# Patient Record
Sex: MALE | Race: BLACK OR AFRICAN AMERICAN | NOT HISPANIC OR LATINO | Employment: FULL TIME
[De-identification: names, ages, dates, MRNs, and addresses within clinical notes are randomized per-mention and may not be internally consistent; named-entity substitution may affect disease eponyms.]

---

## 2017-02-08 ENCOUNTER — DIAGNOSTIC TRANS (OUTPATIENT)
Dept: OTHER | Age: 32
End: 2017-02-08

## 2017-02-08 ENCOUNTER — HOSPITAL (OUTPATIENT)
Dept: OTHER | Age: 32
End: 2017-02-08
Attending: EMERGENCY MEDICINE

## 2017-02-08 LAB
ANALYZER ANC (IANC): ABNORMAL
ANION GAP SERPL CALC-SCNC: 15 MMOL/L (ref 10–20)
BASOPHILS # BLD: 0 THOUSAND/MCL (ref 0–0.3)
BASOPHILS NFR BLD: 0 %
BUN SERPL-MCNC: 14 MG/DL (ref 10–20)
BUN/CREAT SERPL: 16 (ref 7–25)
CALCIUM SERPL-MCNC: 9.1 MG/DL (ref 8.4–10.2)
CHLORIDE: 105 MMOL/L (ref 98–107)
CO2 SERPL-SCNC: 25 MMOL/L (ref 21–32)
CREAT SERPL-MCNC: 0.85 MG/DL (ref 0.67–1.17)
DIFFERENTIAL METHOD BLD: ABNORMAL
EOSINOPHIL # BLD: 0.1 THOUSAND/MCL (ref 0.1–0.5)
EOSINOPHIL NFR BLD: 0 %
ERYTHROCYTE [DISTWIDTH] IN BLOOD: 15.1 % (ref 11–15)
GLUCOSE SERPL-MCNC: 100 MG/DL (ref 65–99)
HEMATOCRIT: 43 % (ref 39–51)
HGB BLD-MCNC: 14 GM/DL (ref 13–17)
LYMPHOCYTES # BLD: 2.8 THOUSAND/MCL (ref 1–4.8)
LYMPHOCYTES NFR BLD: 24 %
MCH RBC QN AUTO: 25.7 PG (ref 26–34)
MCHC RBC AUTO-ENTMCNC: 32.6 GM/DL (ref 32–36.5)
MCV RBC AUTO: 78.9 FL (ref 78–100)
MONOCYTES # BLD: 1.9 THOUSAND/MCL (ref 0.3–0.9)
MONOCYTES NFR BLD: 17 %
NEUTROPHILS # BLD: 6.6 THOUSAND/MCL (ref 1.8–7.7)
NEUTROPHILS NFR BLD: 59 %
NEUTS SEG NFR BLD: ABNORMAL %
PERCENT NRBC: ABNORMAL
PLATELET # BLD: 294 THOUSAND/MCL (ref 140–450)
POTASSIUM SERPL-SCNC: 3.7 MMOL/L (ref 3.4–5.1)
RBC # BLD: 5.45 MILLION/MCL (ref 4.5–5.9)
SODIUM SERPL-SCNC: 141 MMOL/L (ref 135–145)
TROPONIN I SERPL HS-MCNC: <0.02 NG/ML
WBC # BLD: 11.4 THOUSAND/MCL (ref 4.2–11)

## 2017-04-16 ENCOUNTER — HOSPITAL (OUTPATIENT)
Dept: OTHER | Age: 32
End: 2017-04-16
Attending: INTERNAL MEDICINE

## 2017-04-16 LAB
# SPEC OBTAINED: 4
ALBUMIN SERPL-MCNC: 3.9 GM/DL (ref 3.6–5.1)
ALBUMIN/GLOB SERPL: 1 {RATIO} (ref 1–2.4)
ALP SERPL-CCNC: 85 UNIT/L (ref 45–117)
ALT SERPL-CCNC: 40 UNIT/L
ANALYZER ANC (IANC): ABNORMAL
ANION GAP SERPL CALC-SCNC: 13 MMOL/L (ref 10–20)
APPEARANCE CSF: ABNORMAL
AST SERPL-CCNC: 29 UNIT/L
BASOPHILS # BLD: 0 THOUSAND/MCL (ref 0–0.3)
BASOPHILS NFR BLD: 0 %
BASOPHILS NFR CSF: 1 %
BILIRUB SERPL-MCNC: 1 MG/DL (ref 0.2–1)
BUN SERPL-MCNC: 13 MG/DL (ref 6–20)
BUN/CREAT SERPL: 16 (ref 7–25)
C GATTII+NEOFOR DNA CSF QL NAA+NON-PROBE: NOT DETECTED
CALCIUM SERPL-MCNC: 9 MG/DL (ref 8.4–10.2)
CHLORIDE: 103 MMOL/L (ref 98–107)
CMV DNA CSF QL NAA+NON-PROBE: NOT DETECTED
CO2 SERPL-SCNC: 27 MMOL/L (ref 21–32)
CREAT SERPL-MCNC: 0.8 MG/DL (ref 0.67–1.17)
CSF DIFFERENTIAL REMARKS(CREM): ABNORMAL
DIFFERENTIAL METHOD BLD: ABNORMAL
E COLI K1 DNA CSF QL NAA+NON-PROBE: NOT DETECTED
EOSINOPHIL # BLD: 0 THOUSAND/MCL (ref 0.1–0.5)
EOSINOPHIL NFR BLD: 0 %
EOSINOPHIL NFR CSF: ABNORMAL %
ERYTHROCYTE [DISTWIDTH] IN BLOOD: 14.8 % (ref 11–15)
EV RNA CSF QL NAA+NON-PROBE: NOT DETECTED
GLOBULIN SER-MCNC: 4.1 GM/DL (ref 2–4)
GLUCOSE CSF-MCNC: 65 MG/DL (ref 40–70)
GLUCOSE SERPL-MCNC: 132 MG/DL (ref 65–99)
GP B STREP DNA CSF QL NAA+NON-PROBE: NOT DETECTED
GP B STREP DNA CSF QL NAA+NON-PROBE: NOT DETECTED
HAEM INFLU DNA CSF QL NAA+NON-PROBE: NOT DETECTED
HEMATOCRIT: 44.5 % (ref 39–51)
HGB BLD-MCNC: 14.7 GM/DL (ref 13–17)
HHV6 DNA CSF QL NAA+NON-PROBE: NOT DETECTED
HSV1 DNA CSF QL NAA+NON-PROBE: NOT DETECTED
HSV2 DNA CSF QL NAA+NON-PROBE: POSITIVE
L MONOCYTOG DNA CSF QL NAA+NON-PROBE: NOT DETECTED
LACTATE BLDV-SCNC: 1.3 MMOL/L (ref 0–2)
LYMPHOCYTES # BLD: 1.7 THOUSAND/MCL (ref 1–4.8)
LYMPHOCYTES NFR BLD: 11 %
LYMPHOCYTES NFR CSF: 87 % (ref 28–95)
MCH RBC QN AUTO: 26.4 PG (ref 26–34)
MCHC RBC AUTO-ENTMCNC: 33 GM/DL (ref 32–36.5)
MCV RBC AUTO: 79.9 FL (ref 78–100)
MONOCYTES # BLD: 0.9 THOUSAND/MCL (ref 0.3–0.9)
MONOCYTES NFR BLD: 6 %
MONOS+MACROS NFR CSF: 9 % (ref 16–56)
N MEN DNA CSF QL NAA+NON-PROBE: NOT DETECTED
NEUTROPHILS # BLD: 12.7 THOUSAND/MCL (ref 1.8–7.7)
NEUTROPHILS NFR BLD: 83 %
NEUTS SEG NFR BLD: ABNORMAL %
NEUTS SEG NFR CSF: 3 %
NUC CELL # CSF: 676 /MCL (ref 0–10)
PARECHOVIRUS A RNA CSF QL NAA+NON-PROBE: NOT DETECTED
PERCENT NRBC: ABNORMAL
PLATELET # BLD: 344 THOUSAND/MCL (ref 140–450)
POTASSIUM SERPL-SCNC: 4.1 MMOL/L (ref 3.4–5.1)
PROT CSF-MCNC: 116 MG/DL (ref 15–45)
PROT SERPL-MCNC: 8 GM/DL (ref 6.4–8.2)
RBC # BLD: 5.57 MILLION/MCL (ref 4.5–5.9)
RBC # CSF: 1 /MCL
RBC TUBE1 # CSF MANUAL: 42.7 CELLS/MCL
SODIUM SERPL-SCNC: 139 MMOL/L (ref 135–145)
SPECIMEN SOURCE: ABNORMAL
SPECIMEN VOL CSF: 5.7 ML
TUBE # CSF: 4
VARICELLA ZOSTER VIRUS (RMEPNL): NOT DETECTED
WBC # BLD: 15.2 THOUSAND/MCL (ref 4.2–11)
XANTHOCHROMIA CSF QL: ABNORMAL

## 2017-04-17 ENCOUNTER — CHARTING TRANS (OUTPATIENT)
Dept: OTHER | Age: 32
End: 2017-04-17

## 2017-04-17 LAB
AMORPH SED URNS QL MICRO: ABNORMAL
ANALYZER ANC (IANC): ABNORMAL
ANION GAP SERPL CALC-SCNC: 11 MMOL/L (ref 10–20)
APPEARANCE UR: CLEAR
BILIRUB UR QL: NEGATIVE
BUN SERPL-MCNC: 11 MG/DL (ref 6–20)
BUN/CREAT SERPL: 15 (ref 7–25)
CALCIUM SERPL-MCNC: 8.2 MG/DL (ref 8.4–10.2)
CAOX CRY URNS QL MICRO: ABNORMAL
CHLORIDE: 102 MMOL/L (ref 98–107)
CO2 SERPL-SCNC: 26 MMOL/L (ref 21–32)
COLOR UR: YELLOW
CREAT SERPL-MCNC: 0.74 MG/DL (ref 0.67–1.17)
EPITH CASTS #/AREA URNS LPF: ABNORMAL /[LPF]
ERYTHROCYTE [DISTWIDTH] IN BLOOD: 15 % (ref 11–15)
FATTY CASTS #/AREA URNS LPF: ABNORMAL /[LPF]
GLUCOSE SERPL-MCNC: 129 MG/DL (ref 65–99)
GLUCOSE UR-MCNC: NEGATIVE MG/DL
GRAN CASTS #/AREA URNS LPF: ABNORMAL /[LPF]
HEMATOCRIT: 40.4 % (ref 39–51)
HGB BLD-MCNC: 12.9 GM/DL (ref 13–17)
HGB UR QL: NEGATIVE
HIV ANTIGEN/ANTIBODY SCREEN: NONREACTIVE
HYALINE CASTS #/AREA URNS LPF: ABNORMAL /[LPF]
KETONES UR-MCNC: NEGATIVE MG/DL
LEUKOCYTE ESTERASE UR QL STRIP: NEGATIVE
MCH RBC QN AUTO: 25.9 PG (ref 26–34)
MCHC RBC AUTO-ENTMCNC: 31.9 GM/DL (ref 32–36.5)
MCV RBC AUTO: 81.1 FL (ref 78–100)
MICROSCOPIC (MT): ABNORMAL
MIXED CELL CASTS #/AREA URNS LPF: ABNORMAL /[LPF]
MUCOUS THREADS URNS QL MICRO: ABNORMAL
NITRITE UR QL: NEGATIVE
PH UR: 6 UNIT (ref 5–7)
PLATELET # BLD: 269 THOUSAND/MCL (ref 140–450)
POTASSIUM SERPL-SCNC: 3.3 MMOL/L (ref 3.4–5.1)
PROT UR QL: NEGATIVE MG/DL
RBC # BLD: 4.98 MILLION/MCL (ref 4.5–5.9)
RBC CASTS #/AREA URNS LPF: ABNORMAL /[LPF]
RENAL EPI CELLS #/AREA URNS HPF: ABNORMAL /[HPF]
SODIUM SERPL-SCNC: 136 MMOL/L (ref 135–145)
SP GR UR: >1.03 (ref 1–1.03)
SPECIMEN SOURCE: ABNORMAL
SPERM URNS QL MICRO: ABNORMAL
T VAGINALIS URNS QL MICRO: ABNORMAL
TRI-PHOS CRY URNS QL MICRO: ABNORMAL
URATE CRY URNS QL MICRO: ABNORMAL
URNS CMNT MICRO: ABNORMAL
UROBILINOGEN UR QL: 0.2 MG/DL (ref 0–1)
VANCOMYCIN TROUGH SERPL-MCNC: 11.6 MCG/ML (ref 10–20)
WAXY CASTS #/AREA URNS LPF: ABNORMAL /[LPF]
WBC # BLD: 10.9 THOUSAND/MCL (ref 4.2–11)
WBC CASTS #/AREA URNS LPF: ABNORMAL /[LPF]
YEAST HYPHAE URNS QL MICRO: ABNORMAL
YEAST URNS QL MICRO: ABNORMAL

## 2017-04-18 LAB
ANION GAP SERPL CALC-SCNC: 10 MMOL/L (ref 10–20)
BUN SERPL-MCNC: 7 MG/DL (ref 6–20)
BUN/CREAT SERPL: 9 (ref 7–25)
CALCIUM SERPL-MCNC: 8.2 MG/DL (ref 8.4–10.2)
CHLORIDE: 101 MMOL/L (ref 98–107)
CO2 SERPL-SCNC: 30 MMOL/L (ref 21–32)
CREAT SERPL-MCNC: 0.77 MG/DL (ref 0.67–1.17)
GLUCOSE SERPL-MCNC: 109 MG/DL (ref 65–99)
POTASSIUM SERPL-SCNC: 3.8 MMOL/L (ref 3.4–5.1)
SODIUM SERPL-SCNC: 137 MMOL/L (ref 135–145)

## 2021-02-18 ENCOUNTER — HOSPITAL ENCOUNTER (OUTPATIENT)
Dept: HOSPITAL 63 - ER | Age: 36
Setting detail: OBSERVATION
LOS: 1 days | Discharge: HOME | End: 2021-02-19
Attending: HOSPITALIST | Admitting: HOSPITALIST
Payer: COMMERCIAL

## 2021-02-18 VITALS — DIASTOLIC BLOOD PRESSURE: 68 MMHG | SYSTOLIC BLOOD PRESSURE: 115 MMHG

## 2021-02-18 VITALS — SYSTOLIC BLOOD PRESSURE: 107 MMHG | DIASTOLIC BLOOD PRESSURE: 70 MMHG

## 2021-02-18 VITALS — DIASTOLIC BLOOD PRESSURE: 60 MMHG | SYSTOLIC BLOOD PRESSURE: 107 MMHG

## 2021-02-18 VITALS — DIASTOLIC BLOOD PRESSURE: 68 MMHG | SYSTOLIC BLOOD PRESSURE: 117 MMHG

## 2021-02-18 VITALS — DIASTOLIC BLOOD PRESSURE: 56 MMHG | SYSTOLIC BLOOD PRESSURE: 115 MMHG

## 2021-02-18 VITALS — SYSTOLIC BLOOD PRESSURE: 116 MMHG | DIASTOLIC BLOOD PRESSURE: 68 MMHG

## 2021-02-18 VITALS — BODY MASS INDEX: 19.3 KG/M2 | WEIGHT: 155.21 LBS | HEIGHT: 75 IN

## 2021-02-18 DIAGNOSIS — K29.20: ICD-10-CM

## 2021-02-18 DIAGNOSIS — F32.9: ICD-10-CM

## 2021-02-18 DIAGNOSIS — E87.6: ICD-10-CM

## 2021-02-18 DIAGNOSIS — Z23: ICD-10-CM

## 2021-02-18 DIAGNOSIS — D50.9: ICD-10-CM

## 2021-02-18 DIAGNOSIS — F10.231: ICD-10-CM

## 2021-02-18 DIAGNOSIS — F41.9: ICD-10-CM

## 2021-02-18 DIAGNOSIS — Z79.899: ICD-10-CM

## 2021-02-18 DIAGNOSIS — D64.9: Primary | ICD-10-CM

## 2021-02-18 LAB
ALBUMIN SERPL-MCNC: 3.7 G/DL (ref 3.4–5)
ALBUMIN/GLOB SERPL: 0.9 {RATIO} (ref 1–1.7)
ALP SERPL-CCNC: 188 U/L (ref 46–116)
ALT SERPL-CCNC: 259 U/L (ref 16–63)
AMPHETAMINE/METHAMPHETAMINE: (no result)
ANION GAP SERPL CALC-SCNC: 14 MMOL/L (ref 6–14)
ANISOCYTOSIS BLD QL SMEAR: SLIGHT
APTT PPP: (no result) S
AST SERPL-CCNC: 310 U/L (ref 15–37)
BACTERIA #/AREA URNS HPF: 0 /HPF
BARBITURATES UR-MCNC: (no result) UG/ML
BASOPHILS # BLD AUTO: 0.1 X10^3/UL (ref 0–0.2)
BASOPHILS NFR BLD: 1 % (ref 0–3)
BENZODIAZ UR-MCNC: (no result) UG/L
BILIRUB SERPL-MCNC: 0.8 MG/DL (ref 0.2–1)
BILIRUB UR QL STRIP: (no result)
BUN/CREAT SERPL: 14 (ref 6–20)
CA-I SERPL ISE-MCNC: 10 MG/DL (ref 8–26)
CALCIUM SERPL-MCNC: 8.8 MG/DL (ref 8.5–10.1)
CANNABINOIDS UR-MCNC: (no result) UG/L
CHLORIDE SERPL-SCNC: 101 MMOL/L (ref 98–107)
CO2 SERPL-SCNC: 22 MMOL/L (ref 21–32)
COCAINE UR-MCNC: (no result) NG/ML
CREAT SERPL-MCNC: 0.7 MG/DL (ref 0.7–1.3)
DACRYOCYTES BLD QL SMEAR: PRESENT
EOSINOPHIL NFR BLD: 0 X10^3/UL (ref 0–0.7)
EOSINOPHIL NFR BLD: 1 % (ref 0–3)
ERYTHROCYTE [DISTWIDTH] IN BLOOD BY AUTOMATED COUNT: 18.9 % (ref 11.5–14.5)
FIBRINOGEN PPP-MCNC: CLEAR MG/DL
GFR SERPLBLD BASED ON 1.73 SQ M-ARVRAT: 126.9 ML/MIN
GLOBULIN SER-MCNC: 4.1 G/DL (ref 2.2–3.8)
GLUCOSE SERPL-MCNC: 95 MG/DL (ref 70–99)
GLUCOSE UR STRIP-MCNC: (no result) MG/DL
HCT VFR BLD CALC: 23 % (ref 39–53)
HGB BLD-MCNC: 6.7 G/DL (ref 13–17.5)
HYPOCHROMIA BLD QL SMEAR: (no result)
LYMPHOCYTES # BLD: 0.7 X10^3/UL (ref 1–4.8)
LYMPHOCYTES NFR BLD AUTO: 13 % (ref 24–48)
MAGNESIUM SERPL-MCNC: 2 MG/DL (ref 1.8–2.4)
MCH RBC QN AUTO: 22 PG (ref 25–35)
MCHC RBC AUTO-ENTMCNC: 29 G/DL (ref 31–37)
MCV RBC AUTO: 76 FL (ref 79–100)
METHADONE SERPL-MCNC: (no result) NG/ML
MICROCYTES BLD QL SMEAR: (no result)
MONO #: 0.8 X10^3/UL (ref 0–1.1)
MONOCYTES NFR BLD: 15 % (ref 0–9)
NEUT #: 4.1 X10^3UL (ref 1.8–7.7)
NEUTROPHILS NFR BLD AUTO: 71 % (ref 31–73)
NITRITE UR QL STRIP: (no result)
OPIATES UR-MCNC: (no result) NG/ML
PCP SERPL-MCNC: (no result) MG/DL
PLATELET # BLD AUTO: 466 X10^3/UL (ref 140–400)
PLATELET # BLD EST: (no result) 10*3/UL
POLYCHROMASIA BLD QL SMEAR: PRESENT
POTASSIUM SERPL-SCNC: 2.9 MMOL/L (ref 3.5–5.1)
PROT SERPL-MCNC: 7.8 G/DL (ref 6.4–8.2)
RBC # BLD AUTO: 3.04 X10^6/UL (ref 4.3–5.7)
RBC #/AREA URNS HPF: (no result) /HPF (ref 0–2)
SODIUM SERPL-SCNC: 137 MMOL/L (ref 136–145)
SP GR UR STRIP: 1.02
SQUAMOUS #/AREA URNS LPF: (no result) /LPF
UROBILINOGEN UR-MCNC: 1 MG/DL
WBC # BLD AUTO: 5.8 X10^3/UL (ref 4–11)
WBC #/AREA URNS HPF: (no result) /HPF (ref 0–4)

## 2021-02-18 PROCEDURE — 36415 COLL VENOUS BLD VENIPUNCTURE: CPT

## 2021-02-18 PROCEDURE — 85025 COMPLETE CBC W/AUTO DIFF WBC: CPT

## 2021-02-18 PROCEDURE — 96375 TX/PRO/DX INJ NEW DRUG ADDON: CPT

## 2021-02-18 PROCEDURE — C9113 INJ PANTOPRAZOLE SODIUM, VIA: HCPCS

## 2021-02-18 PROCEDURE — 90471 IMMUNIZATION ADMIN: CPT

## 2021-02-18 PROCEDURE — P9016 RBC LEUKOCYTES REDUCED: HCPCS

## 2021-02-18 PROCEDURE — 83690 ASSAY OF LIPASE: CPT

## 2021-02-18 PROCEDURE — G0378 HOSPITAL OBSERVATION PER HR: HCPCS

## 2021-02-18 PROCEDURE — 86850 RBC ANTIBODY SCREEN: CPT

## 2021-02-18 PROCEDURE — 90686 IIV4 VACC NO PRSV 0.5 ML IM: CPT

## 2021-02-18 PROCEDURE — 80053 COMPREHEN METABOLIC PANEL: CPT

## 2021-02-18 PROCEDURE — 86901 BLOOD TYPING SEROLOGIC RH(D): CPT

## 2021-02-18 PROCEDURE — 93005 ELECTROCARDIOGRAM TRACING: CPT

## 2021-02-18 PROCEDURE — 81001 URINALYSIS AUTO W/SCOPE: CPT

## 2021-02-18 PROCEDURE — 99284 EMERGENCY DEPT VISIT MOD MDM: CPT

## 2021-02-18 PROCEDURE — 80307 DRUG TEST PRSMV CHEM ANLYZR: CPT

## 2021-02-18 PROCEDURE — 36430 TRANSFUSION BLD/BLD COMPNT: CPT

## 2021-02-18 PROCEDURE — 96374 THER/PROPH/DIAG INJ IV PUSH: CPT

## 2021-02-18 PROCEDURE — 83540 ASSAY OF IRON: CPT

## 2021-02-18 PROCEDURE — 83735 ASSAY OF MAGNESIUM: CPT

## 2021-02-18 PROCEDURE — 86920 COMPATIBILITY TEST SPIN: CPT

## 2021-02-18 PROCEDURE — 96376 TX/PRO/DX INJ SAME DRUG ADON: CPT

## 2021-02-18 PROCEDURE — 82274 ASSAY TEST FOR BLOOD FECAL: CPT

## 2021-02-18 PROCEDURE — G0379 DIRECT REFER HOSPITAL OBSERV: HCPCS

## 2021-02-18 PROCEDURE — 83550 IRON BINDING TEST: CPT

## 2021-02-18 PROCEDURE — 86900 BLOOD TYPING SEROLOGIC ABO: CPT

## 2021-02-18 NOTE — NUR
NURSING NOTE 



UPON ROUNDS, THIS NURSE NOTICED PT STARTING TO HAVE TREMOR AND ANXIETY. CIWA ASSESSMENT 
REPEATED, SCORE OF 6. PRN ORDER FOR ATIVAN GIVEN. PT GIVEN CHIPS AND GATORADE AND CURRENTLY 
IN BED WATCHING TV. 



WILL CONTINUE TO MONITOR.



ALVINO KOWALSKI.

## 2021-02-18 NOTE — PHYS DOC
Past History


Past Medical History:  Alcoholism


Past Surgical History:  No Surgical History


Additional Smoking Information:  


1/2 PPD


Alcohol Use:  Heavy


Additional Alcohol Information:  


recently decreased to 0.5-1 pint per day of Vodka from 2.5 pints/day.


Social History Narrative:  see HPI. previous high levels of anxiety related to 

ETOH withdrawl.





General Adult


EDM:


Chief Complaint:  WITHDRAWAL





HPI:


HPI:





Patient is a 35-year male coming in for medical clearance for alcohol rehab.  

Patient states he is already contacted rehab facility [facility] except therapy 

is medically cleared.  States he has a long history of daily alcohol use and 

continues to drink a half of a pint to a pint of vodka daily.  Last alcohol 

intake was last night.  Has a history of anxiety alcohol withdrawal tremors but 

denies any his seizure history.  Denies any auditory visual hallucinations.  No 

other medical additions, otherwise has been well.  Denies any other drug use.  

Denies any known history of cirrhosis.  Did have an episode of GI bleeding about

3 weeks ago.  States that he had vomited blood and had dark stools but symptoms 

only lasted for 1 to 2 days and have completely resolved now.  Patient states he

is having brown stools not diarrhea.





Review of Systems:


Review of Systems:


All other systems within normal limits except for as noted in the HPI





Physical Exam:


PE:


Constitutional: Well developed, well nourished, no acute distress, non-toxic 

appearance. []


HENT: Normocephalic, atraumatic, bilateral external ears normal,  nose normal. 

[]


Eyes: PERRLA, conjunctiva normal, no discharge. [] 


Neck: No rigidity, supple, no stridor. [] 


Cardiovascular: Regular rate and rhythm, brisk cap refill []


Lungs & Thorax: Non labored symmetric respirations, no tachypnea or respiratory 

distress []


Abdomen: Soft, nondistended, no abdominal tenderness, negative Martinez sign


Skin: Warm, dry, no erythema, no rash. [] 


Back: Unremarkable


Extremities: No deformities, range of motion grossly intact, no lower extremity 

edema [] 


Neurologic: Alert and oriented X 3, no focal deficits noted. []


Psychologic: Affect normal, judgement normal, mood normal. []





Current Patient Data:


Vital Signs:





                                   Vital Signs








  Date Time  Temp Pulse Resp B/P (MAP) Pulse Ox O2 Delivery O2 Flow Rate FiO2


 


2/18/21 09:49 98.0 111 18 119/74 (89) 99 Room Air  











EKG:


EKG:


Sinus rhythm, heart rate 95 bpm, normal axis, no ST elevation or depression, no 

ectopy, normal intervals.  []





Radiology/Procedures:


Radiology/Procedures:


[]





Heart Score:


Risk Factors:


Risk Factors:  DM, Current or recent (<one month) smoker, HTN, HLP, family 

history of CAD, obesity.


Risk Scores:


Score 0 - 3:  2.5% MACE over next 6 weeks - Discharge Home


Score 4 - 6:  20.3% MACE over next 6 weeks - Admit for Clinical Observation


Score 7 - 10:  72.7% MACE over next 6 weeks - Early Invasive Strategies





Course & Med Decision Making:


Course & Med Decision Making


Pertinent Labs and Imaging studies reviewed. (See chart for details)





[]





Dragon Disclaimer:


Dragon Disclaimer:


This electronic medical record was generated, in whole or in part, using a voice

recognition dictation system.





Departure


Departure:


Impression:  


   Primary Impression:  


   Hypokalemia


   Additional Impressions:  


   Alcohol dependence


   Microcytic anemia


Disposition:  09 ADMITTED AS INPT THIS HOSP


Admitting Physician:  Christophe Lam


Condition:  STABLE


Referrals:  


PCP,JOSLYN (PCP)











ROBEL BRASHER MD              Feb 18, 2021 10:09

## 2021-02-18 NOTE — NUR
NURSING NOTE ADMIT 



PT ADMIT TO ROOM ICU BED 5 VIA EMS FROM ED WITH DX OF ANEMIA, ETOH WITHDRAWAL, AND 
HYPOKALEMIA. PT REPORTS NO MEDICAL HX OTHER THAN WAS TOLD YEARS AGO THAT HE WAS PREDIABETIC. 
PT STATES HE WAS COMING TO ED FOR MEDICAL CLEARANCE FOR ALCOHOL REHABILITATION PROGRAM AND 
WAS FOUND TO BE ANEMIC. PT STATES HE TAKES NO HOME MEDICATION. PT STATES HE HAS GREAT 
SUPPORT AT HOME, HIS MOTHER PAUL IS VERY INVOLVED WITH HIS CARE, AND HE IS READY FOR 
REHAB. PT STATES HE HAS CUT DOWN ON HIS ALCOHOL CONSUMPTION, WAS DRINKING 2 PINTS OF VODKA A 
DAY, NOW DOWN TO 1 PINT OF VODKA A DAY. 



PT SETTLED IN ROOM. WILL CONTINUE TO MONITOR.



ALVINO KOWALSKI.

## 2021-02-18 NOTE — EKG
[facility] [address], [place] [address]

Test Date:    2021               Test Time:    09:26:39

Pat Name:     GERALD CORTEZ        Department:   

Patient ID:   [id number]           Room:          

Gender:       M                        Technician:   RASHIDA

:          1985               Requested By: ROBEL BRASHER

Order Number: [id number]            Reading MD:     

                                 Measurements

Intervals                              Axis          

Rate:         95                       P:            56

PA:           136                      QRS:          59

QRSD:         90                       T:            52

QT:           352                                    

QTc:          446                                    

                           Interpretive Statements

SINUS RHYTHM

NORMAL ECG

RI6.02

No previous ECG available for comparison

## 2021-02-18 NOTE — NUR
NURSING NOTE BLOOD TRANSFUSION



BLOOD TRANSFUSION STARTED AT 1344. TUBING PRIMED WITH NORMAL SALINE AND THEN PRIMED WITH 
BLOOD. TRANSFUSION STARTED AT 60MLS/HR. PT MONITORED CLOSELY X 15 MIN. NO REACTION NOTED. 
TRANSFUSION INCREASED TO 125MLS/HR. 



WILL CONTINUE TO MONITOR.



MEGHANA DE OLIVEIRA.

## 2021-02-18 NOTE — NUR
NURSING NOTE 



SPOKE WITH DR NEGRETE ABOUT PT ADMISSION, ORDER RECEIVED FOR 40MEQ OF POTASSIUM CHLOR 1X DOSE 
NOW, ORDER FOR PRN ATIVAN 1 MG Q4 HRS PRN REPEAT LABS IN AM. 



PER DR NEGRETE, NO ELECTROLYTE REPLACEMENT PROTOCOL, NO ALCOHOL WITHDRAWAL PROTOCOL NEEDED, NO 
ADDITIONAL FLUIDS AT THIS TIME POST TRANSFUSION PER DR NEGRETE. 



REFER TO CASE MANAGEMENT FOR DISCHARGE PLANNING AND REHAB PER DR NEGRETE.



ALVINO KOWALSKI.

## 2021-02-19 VITALS — DIASTOLIC BLOOD PRESSURE: 76 MMHG | SYSTOLIC BLOOD PRESSURE: 120 MMHG

## 2021-02-19 VITALS — SYSTOLIC BLOOD PRESSURE: 113 MMHG | DIASTOLIC BLOOD PRESSURE: 70 MMHG

## 2021-02-19 LAB
ALBUMIN SERPL-MCNC: 3.1 G/DL (ref 3.4–5)
ALBUMIN/GLOB SERPL: 0.8 {RATIO} (ref 1–1.7)
ALP SERPL-CCNC: 166 U/L (ref 46–116)
ALT SERPL-CCNC: 227 U/L (ref 16–63)
ANION GAP SERPL CALC-SCNC: 12 MMOL/L (ref 6–14)
AST SERPL-CCNC: 236 U/L (ref 15–37)
BASOPHILS # BLD AUTO: 0.1 X10^3/UL (ref 0–0.2)
BASOPHILS NFR BLD: 1 % (ref 0–3)
BILIRUB SERPL-MCNC: 1.1 MG/DL (ref 0.2–1)
BUN/CREAT SERPL: 11 (ref 6–20)
CA-I SERPL ISE-MCNC: 8 MG/DL (ref 8–26)
CALCIUM SERPL-MCNC: 8.8 MG/DL (ref 8.5–10.1)
CHLORIDE SERPL-SCNC: 106 MMOL/L (ref 98–107)
CO2 SERPL-SCNC: 23 MMOL/L (ref 21–32)
CREAT SERPL-MCNC: 0.7 MG/DL (ref 0.7–1.3)
EOSINOPHIL NFR BLD: 0.1 X10^3/UL (ref 0–0.7)
EOSINOPHIL NFR BLD: 1 % (ref 0–3)
ERYTHROCYTE [DISTWIDTH] IN BLOOD BY AUTOMATED COUNT: 19.2 % (ref 11.5–14.5)
FECAL OB PT: POSITIVE
GFR SERPLBLD BASED ON 1.73 SQ M-ARVRAT: 126.9 ML/MIN
GLOBULIN SER-MCNC: 3.8 G/DL (ref 2.2–3.8)
GLUCOSE SERPL-MCNC: 97 MG/DL (ref 70–99)
HCT VFR BLD CALC: 23.3 % (ref 39–53)
HGB BLD-MCNC: 7 G/DL (ref 13–17.5)
LYMPHOCYTES # BLD: 0.7 X10^3/UL (ref 1–4.8)
LYMPHOCYTES NFR BLD AUTO: 13 % (ref 24–48)
MCH RBC QN AUTO: 23 PG (ref 25–35)
MCHC RBC AUTO-ENTMCNC: 30 G/DL (ref 31–37)
MCV RBC AUTO: 77 FL (ref 79–100)
MONO #: 0.8 X10^3/UL (ref 0–1.1)
MONOCYTES NFR BLD: 15 % (ref 0–9)
NEUT #: 3.8 X10^3UL (ref 1.8–7.7)
NEUTROPHILS NFR BLD AUTO: 69 % (ref 31–73)
PLATELET # BLD AUTO: 395 X10^3/UL (ref 140–400)
POTASSIUM SERPL-SCNC: 3.8 MMOL/L (ref 3.5–5.1)
PROT SERPL-MCNC: 6.9 G/DL (ref 6.4–8.2)
RBC # BLD AUTO: 3.01 X10^6/UL (ref 4.3–5.7)
SODIUM SERPL-SCNC: 141 MMOL/L (ref 136–145)
WBC # BLD AUTO: 5.6 X10^3/UL (ref 4–11)

## 2021-02-19 NOTE — HP
ADMIT DATE:  02/18/2021



ATTENDING PHYSICIAN:  Dr. Negrete.



CHIEF COMPLAINT:  Symptomatic anemia, weakness.



HISTORY OF PRESENT ILLNESS:  The patient is a 35-year-old gentleman who drinks

to excess.  He is an alcoholic.  He was requesting detoxification.  He

supposedly had a program scheduled in [place], but is not an inpatient facility. 

He had a hemoglobin of 6.8 g/dL, 1 unit of packed cells was ordered.  He was

given transfusion for symptomatic anemia.  He is admitted to the medical floor,

also with impending DT.  CIWA protocol to start.  By the time I saw him the next

morning, he was sober and quite stable.



PAST MEDICAL HISTORY:  Significant for anemia due to gastritis.  He drinks

heavily, a liter of vodka daily.  He has underlying depression.  No surgical

history.  No known drug allergies.  No known prescription.  He was laid off from

work because of his alcohol issues.  He has parents that lived in town.  Father

teaches at the [employer] and [employer].



REVIEW OF SYSTEMS:  Significant for the chronic alcoholism.  He denied any

hematemesis.  He has had some black stool.  All other systems reviewed and

turned to be negative.



PHYSICAL EXAMINATION:

GENERAL:  When I saw him, this is a pleasant young gentleman.

INITIAL VITAL SIGNS:  Showed a blood pressure 113/70, pulse 90 and regular.  He

was afebrile, oxygen saturation 99% on room air.

HEENT:  Head is without trauma.  Pupils are reactive.  Sclerae nonicteric. 

Oropharynx clear.

NECK:  Supple, no bruits.

LUNGS:  Clear.

CARDIOVASCULAR:  Regular heart tones.  No gallops.

ABDOMEN:  Soft, no guarding, no masses.

EXTREMITIES:  Without edema.

NEUROLOGIC:  Focally intact.  Speech was fluent.  He had initially some tremors,

but has since resolved.



LABORATORY DATA:  Hemoglobin on admission was 6.7 g/dL with chemistry panel

fairly unremarkable.  Potassium is 2.9 mEq, iron studies were low at 16,

bilirubin 0.8.  Transaminases are elevated.



ASSESSMENT:

1.  A 35-year-old gentleman, chronic alcoholic.

2.  Symptomatic anemia due to alcoholic gastritis.

3.  Iron deficiency.

4.  Underlying depression with anxiety.

5.  Asymptomatic hypokalemia.



PLAN:

1.  Admit to the inpatient unit.

2.  Transfusion of 1 unit packed red cells and increase oxygen carrying capacity

in this symptomatic patient.

3.  Potassium supplementation.

4.  CIWA protocol.

5.  Proton pump inhibitor.

6.  Serial chemistries and CBC.





______________________________

KRISTEN NEGRETE MD



DR:  BRENDAN/paulo  JOB#:  [id number] / [id number]

DD:  02/19/2021 10:54  DT:  02/19/2021 11:03

## 2021-02-19 NOTE — NUR
Case management reached out to Taryn with [facility] for help with 
resources for this pt.  [facility] does accept his insurance and if after clinical team assesses pt 
they will determine if they can help pt. Will await for clinical team decision

## 2021-02-19 NOTE — DS
DATE OF DISCHARGE:  02/19/2021



ATTENDING PHYSICIAN:  Dr. Kristen Negrete



FINAL DISCHARGE DIAGNOSES:

1.  Acute alcohol withdrawal, improved.

2.  Delirium tremens, resolved.

3.  Chronic alcoholism.

4.  Symptomatic anemia.

5.  Iron deficiency.

6.  Alcoholic gastritis.

7.  Asymptomatic hypokalemia.

8.  Underlying depression with anxiety.



HISTORY AND PHYSICAL:  The patient is a 35-year-old gentleman admitted through

the ED, he was having any impending withdrawal, drinking heavily a liter of

vodka a day.  He was in process of getting him to an inpatient program.  He also

was found to have a hemoglobin of 6.7 gram, 1 unit of blood was ordered and

transfused.



PHYSICAL EXAMINATION:  Please see the dictated note.



PERTINENT LABORATORY AND X-RAY STUDIES:  Admission hemoglobin was 6.7 g/dL,

repeated after 1 unit was up to 7.0.  He was not symptomatic.  It will take some

time to equilibrate.  His platelets are adequate.  His MCV is diminished.  Iron

levels were low.  Potassium was 2.9 mEq, with replacement was up to 3.8 mEq in

the next day.



COURSE IN THE HOSPITAL:  The patient was given 1 unit of packed red cells.  He

was symptomatic.  This was given to increase oxygen carrying capacity in this

symptomatic patient.  CIWA protocol entertained.  He had impending tremors, but

was quite stable and improved the next day.  At this time, we gave him the

numbers and I had a chance to talk with his father, given the patient's

permission to discuss the case.  We will look for an inpatient psychiatric unit

from home.  He is discharged home with a prescription for Ativan 1 mg 3 times a

day, Nexium 40 mg p.o. daily and the Vitron-C one b.i.d. to help with his low

iron stores.  Strong encouragement to avoid further alcohol altogether whether

or not he will quit drinking remains to be seen.  We gave him some leads

telephone number and places of inpatient treatment programs.  I explained to the

patient and the father, the best treatment for depression that we have is a

combination of psychotherapy and pharmacotherapy.  He was discharged then from

our hospital in stable condition with explicit instructions and followup care.





______________________________

KRISTEN NEGRETE MD



DR:  BRENDAN/paulo  JOB#:  [id number] / [id number]

DD:  02/19/2021 11:24  DT:  02/19/2021 11:49

## 2021-02-19 NOTE — NUR
Dr. Lam here to see pt this morning.  Pt's hgb is higher. Dr. Lam is in agreement with pt 
going home and to get into outpatient detox facility of his choice.  Dr. Lam called and spoke 
to pt's father regarding poc.  Will see if we have a list of outpatient detox centers to 
assist pt.

## 2021-02-19 NOTE — NUR
This RN assessed CIWA score and currently pt is at a 6.  Ativan 2mg IV given per protocol 
orders.  Advised pt that if he is having any new withdrawl symptoms or if Ativan is not 
helping. Pt is an agreement.

## 2021-02-19 NOTE — NUR
reviewed discharge instructions with pt. written script for ativan 1mg 1 tab po TID #90 tabs 
was written by Dr. Lam.  Also reviewed discharge instructions with pt's father.  IV was dc'd 
prior to discharge. Pt ambulated to pov upon discharge, he refused wheelchair.

## 2021-02-19 NOTE — NUR
[facility] clinical team has deemed that pt is a good candidate for their program.  Due to current 
[place] the [place], the easiest place for him to be able to get into asap is their 
[facility].  Yaneth with [facility] spoke with pt in depth about his options. Pt wants to 
discuss with his parents before deciding.  Info will be provided to parents upon discharge.

## 2022-08-02 ENCOUNTER — APPOINTMENT (OUTPATIENT)
Dept: CT IMAGING | Age: 37
DRG: 225 | End: 2022-08-02
Attending: EMERGENCY MEDICINE

## 2022-08-02 ENCOUNTER — HOSPITAL ENCOUNTER (INPATIENT)
Age: 37
LOS: 6 days | Discharge: HOME | DRG: 225 | End: 2022-08-08
Attending: EMERGENCY MEDICINE | Admitting: INTERNAL MEDICINE

## 2022-08-02 ENCOUNTER — APPOINTMENT (OUTPATIENT)
Dept: GENERAL RADIOLOGY | Age: 37
DRG: 225 | End: 2022-08-02
Attending: EMERGENCY MEDICINE

## 2022-08-02 DIAGNOSIS — R07.9 CHEST PAIN, UNSPECIFIED TYPE: ICD-10-CM

## 2022-08-02 DIAGNOSIS — I46.9 CARDIAC ARREST (CMD): Primary | ICD-10-CM

## 2022-08-02 DIAGNOSIS — D72.829 LEUKOCYTOSIS, UNSPECIFIED TYPE: ICD-10-CM

## 2022-08-02 DIAGNOSIS — R79.89 ELEVATED LACTIC ACID LEVEL: ICD-10-CM

## 2022-08-02 DIAGNOSIS — I49.01 VENTRICULAR FIBRILLATION (CMD): ICD-10-CM

## 2022-08-02 DIAGNOSIS — E87.6 HYPOKALEMIA: ICD-10-CM

## 2022-08-02 PROBLEM — I49.8: Status: ACTIVE | Noted: 2022-08-02

## 2022-08-02 LAB
ALBUMIN SERPL-MCNC: 3.5 G/DL (ref 3.6–5.1)
ALBUMIN/GLOB SERPL: 0.8 {RATIO} (ref 1–2.4)
ALP SERPL-CCNC: 94 UNITS/L (ref 45–117)
ALT SERPL-CCNC: 223 UNITS/L
AMPHETAMINES UR QL SCN>500 NG/ML: NEGATIVE
ANION GAP SERPL CALC-SCNC: 15 MMOL/L (ref 7–19)
AST SERPL-CCNC: 245 UNITS/L
BARBITURATES UR QL SCN>200 NG/ML: NEGATIVE
BASE EXCESS / DEFICIT, ARTERIAL - RESPIRATORY: -8 MMOL/L (ref -2–3)
BASOPHILS # BLD: 0 K/MCL (ref 0–0.3)
BASOPHILS NFR BLD: 0 %
BDY SITE: ABNORMAL
BENZODIAZ UR QL SCN>200 NG/ML: NEGATIVE
BILIRUB SERPL-MCNC: 0.4 MG/DL (ref 0.2–1)
BODY TEMPERATURE: 37 DEGREES
BUN SERPL-MCNC: 18 MG/DL (ref 6–20)
BUN/CREAT SERPL: 15 (ref 7–25)
BZE UR QL SCN>150 NG/ML: NEGATIVE
CA-I BLD-SCNC: 1.11 MMOL/L (ref 1.15–1.29)
CALCIUM SERPL-MCNC: 8.4 MG/DL (ref 8.4–10.2)
CANNABINOIDS UR QL SCN>50 NG/ML: POSITIVE
CHLORIDE BLD-SCNC: 100 MMOL/L (ref 97–110)
CHLORIDE SERPL-SCNC: 104 MMOL/L (ref 97–110)
CO2 SERPL-SCNC: 20 MMOL/L (ref 21–32)
COHGB MFR BLDV: 1.3 %
CONDITION: ABNORMAL
CREAT SERPL-MCNC: 1.23 MG/DL (ref 0.67–1.17)
DEPRECATED RDW RBC: 45.1 FL (ref 39–50)
EOSINOPHIL # BLD: 0 K/MCL (ref 0–0.5)
EOSINOPHIL NFR BLD: 0 %
ERYTHROCYTE [DISTWIDTH] IN BLOOD: 14.7 % (ref 11–15)
ETHANOL SERPL-MCNC: NORMAL MG/DL
FASTING DURATION TIME PATIENT: ABNORMAL H
GFR SERPLBLD BASED ON 1.73 SQ M-ARVRAT: 46 ML/MIN
GLOBULIN SER-MCNC: 4.2 G/DL (ref 2–4)
GLUCOSE BLD-MCNC: 254 MG/DL (ref 65–99)
GLUCOSE BLDC GLUCOMTR-MCNC: 117 MG/DL (ref 70–99)
GLUCOSE SERPL-MCNC: 258 MG/DL (ref 70–99)
HCO3 BLDA-SCNC: 18 MMOL/L (ref 22–28)
HCT VFR BLD CALC: 47 % (ref 39–51)
HCT VFR BLD CALC: 48.5 % (ref 39–51)
HGB BLD-MCNC: 15.5 G/DL (ref 13–17)
HGB BLD-MCNC: 15.6 G/DL (ref 13–17)
LACTATE BLDA-SCNC: 5.1 MMOL/L
LYMPHOCYTES # BLD: 8.2 K/MCL (ref 1–4)
LYMPHOCYTES NFR BLD: 45 %
MAGNESIUM SERPL-MCNC: 2.6 MG/DL (ref 1.7–2.4)
MCH RBC QN AUTO: 27.4 PG (ref 26–34)
MCHC RBC AUTO-ENTMCNC: 32.2 G/DL (ref 32–36.5)
MCV RBC AUTO: 85.2 FL (ref 78–100)
METHGB MFR BLDMV: 0.8 %
MONOCYTES # BLD: 0.9 K/MCL (ref 0.3–0.9)
MONOCYTES NFR BLD: 5 %
NEUTROPHILS # BLD: 8 K/MCL (ref 1.8–7.7)
NEUTS BAND NFR BLD: 2 % (ref 0–10)
NEUTS SEG NFR BLD: 45 %
NRBC BLD MANUAL-RTO: 0 /100 WBC
NT-PROBNP SERPL-MCNC: 666 PG/ML
OPIATES UR QL SCN>300 NG/ML: NEGATIVE
OXYHGB MFR BLDA: 97.8 % (ref 94–98)
PCO2 BLDA: 36 MM HG (ref 35–48)
PCP UR QL SCN>25 NG/ML: NEGATIVE
PH BLDA: 7.3 UNITS (ref 7.35–7.45)
PLAT MORPH BLD: NORMAL
PLATELET # BLD AUTO: 371 K/MCL (ref 140–450)
PO2 BLDA: 184 MM HG (ref 83–108)
POTASSIUM BLD-SCNC: 2.8 MMOL/L (ref 3.4–5.1)
POTASSIUM SERPL-SCNC: 2.8 MMOL/L (ref 3.4–5.1)
PROT SERPL-MCNC: 7.7 G/DL (ref 6.4–8.2)
RAINBOW EXTRA TUBES HOLD SPECIMEN: NORMAL
RBC # BLD: 5.69 MIL/MCL (ref 4.5–5.9)
RBC MORPH BLD: NORMAL
SAO2 % BLDA: 100 % (ref 95–99)
SAO2 % BLDA: 22 % (ref 15–23)
SARS-COV-2 RNA RESP QL NAA+PROBE: NOT DETECTED
SERVICE CMNT-IMP: NORMAL
SERVICE CMNT-IMP: NORMAL
SODIUM BLD-SCNC: 133 MMOL/L (ref 135–145)
SODIUM SERPL-SCNC: 136 MMOL/L (ref 135–145)
TROPONIN I SERPL DL<=0.01 NG/ML-MCNC: 34 NG/L
VARIANT LYMPHS NFR BLD: 3 % (ref 0–5)
WBC # BLD: 17 K/MCL (ref 4.2–11)
WBC MORPH BLD: NORMAL

## 2022-08-02 PROCEDURE — 84484 ASSAY OF TROPONIN QUANT: CPT | Performed by: EMERGENCY MEDICINE

## 2022-08-02 PROCEDURE — 36600 WITHDRAWAL OF ARTERIAL BLOOD: CPT

## 2022-08-02 PROCEDURE — 82947 ASSAY GLUCOSE BLOOD QUANT: CPT

## 2022-08-02 PROCEDURE — 83605 ASSAY OF LACTIC ACID: CPT

## 2022-08-02 PROCEDURE — 0HDCXZZ EXTRACTION OF LEFT UPPER ARM SKIN, EXTERNAL APPROACH: ICD-10-PCS | Performed by: EMERGENCY MEDICINE

## 2022-08-02 PROCEDURE — 84484 ASSAY OF TROPONIN QUANT: CPT | Performed by: INTERNAL MEDICINE

## 2022-08-02 PROCEDURE — 83735 ASSAY OF MAGNESIUM: CPT | Performed by: EMERGENCY MEDICINE

## 2022-08-02 PROCEDURE — 10000008 HB ROOM CHARGE ICU OR CCU

## 2022-08-02 PROCEDURE — 80053 COMPREHEN METABOLIC PANEL: CPT | Performed by: EMERGENCY MEDICINE

## 2022-08-02 PROCEDURE — 82330 ASSAY OF CALCIUM: CPT

## 2022-08-02 PROCEDURE — 10002807 HB RX 258: Performed by: INTERNAL MEDICINE

## 2022-08-02 PROCEDURE — 82077 ASSAY SPEC XCP UR&BREATH IA: CPT | Performed by: EMERGENCY MEDICINE

## 2022-08-02 PROCEDURE — 96365 THER/PROPH/DIAG IV INF INIT: CPT

## 2022-08-02 PROCEDURE — 85027 COMPLETE CBC AUTOMATED: CPT | Performed by: EMERGENCY MEDICINE

## 2022-08-02 PROCEDURE — 80307 DRUG TEST PRSMV CHEM ANLYZR: CPT | Performed by: EMERGENCY MEDICINE

## 2022-08-02 PROCEDURE — 93005 ELECTROCARDIOGRAM TRACING: CPT | Performed by: EMERGENCY MEDICINE

## 2022-08-02 PROCEDURE — 10002803 HB RX 637: Performed by: INTERNAL MEDICINE

## 2022-08-02 PROCEDURE — 84295 ASSAY OF SERUM SODIUM: CPT

## 2022-08-02 PROCEDURE — 83880 ASSAY OF NATRIURETIC PEPTIDE: CPT | Performed by: EMERGENCY MEDICINE

## 2022-08-02 PROCEDURE — 87635 SARS-COV-2 COVID-19 AMP PRB: CPT | Performed by: EMERGENCY MEDICINE

## 2022-08-02 PROCEDURE — C9803 HOPD COVID-19 SPEC COLLECT: HCPCS

## 2022-08-02 PROCEDURE — 82435 ASSAY OF BLOOD CHLORIDE: CPT

## 2022-08-02 PROCEDURE — 70450 CT HEAD/BRAIN W/O DYE: CPT

## 2022-08-02 PROCEDURE — G1004 CDSM NDSC: HCPCS

## 2022-08-02 PROCEDURE — 96375 TX/PRO/DX INJ NEW DRUG ADDON: CPT

## 2022-08-02 PROCEDURE — 85018 HEMOGLOBIN: CPT

## 2022-08-02 PROCEDURE — 99285 EMERGENCY DEPT VISIT HI MDM: CPT

## 2022-08-02 PROCEDURE — 10002800 HB RX 250 W HCPCS: Performed by: EMERGENCY MEDICINE

## 2022-08-02 PROCEDURE — 71045 X-RAY EXAM CHEST 1 VIEW: CPT

## 2022-08-02 PROCEDURE — 84132 ASSAY OF SERUM POTASSIUM: CPT

## 2022-08-02 PROCEDURE — 83036 HEMOGLOBIN GLYCOSYLATED A1C: CPT | Performed by: INTERNAL MEDICINE

## 2022-08-02 RX ORDER — AMIODARONE HCL/D5W 450 MG/250
1 PLASTIC BAG, INJECTION (ML) INTRAVENOUS CONTINUOUS
Status: DISCONTINUED | OUTPATIENT
Start: 2022-08-02 | End: 2022-08-03

## 2022-08-02 RX ORDER — HYDRALAZINE HYDROCHLORIDE 10 MG/1
10 TABLET, FILM COATED ORAL 3 TIMES DAILY
COMMUNITY

## 2022-08-02 RX ORDER — ACETAMINOPHEN 325 MG/1
650 TABLET ORAL EVERY 4 HOURS PRN
Status: DISCONTINUED | OUTPATIENT
Start: 2022-08-02 | End: 2022-08-03

## 2022-08-02 RX ORDER — POTASSIUM CHLORIDE 14.9 MG/ML
20 INJECTION INTRAVENOUS ONCE
Status: COMPLETED | OUTPATIENT
Start: 2022-08-02 | End: 2022-08-02

## 2022-08-02 RX ORDER — ISOSORBIDE DINITRATE 10 MG/1
10 TABLET ORAL 3 TIMES DAILY
COMMUNITY

## 2022-08-02 RX ORDER — ATORVASTATIN CALCIUM 40 MG/1
20 TABLET, FILM COATED ORAL DAILY
Status: ON HOLD | COMMUNITY
End: 2022-08-08 | Stop reason: HOSPADM

## 2022-08-02 RX ORDER — DEXTROSE MONOHYDRATE 25 G/50ML
25 INJECTION, SOLUTION INTRAVENOUS PRN
Status: DISCONTINUED | OUTPATIENT
Start: 2022-08-02 | End: 2022-08-08 | Stop reason: HOSPADM

## 2022-08-02 RX ORDER — POTASSIUM CHLORIDE 14.9 MG/ML
20 INJECTION INTRAVENOUS ONCE
Status: COMPLETED | OUTPATIENT
Start: 2022-08-02 | End: 2022-08-03

## 2022-08-02 RX ORDER — DEXTROSE MONOHYDRATE 25 G/50ML
12.5 INJECTION, SOLUTION INTRAVENOUS PRN
Status: DISCONTINUED | OUTPATIENT
Start: 2022-08-02 | End: 2022-08-08 | Stop reason: HOSPADM

## 2022-08-02 RX ORDER — NYSTATIN 100000 [USP'U]/G
POWDER TOPICAL EVERY 12 HOURS SCHEDULED
Status: DISCONTINUED | OUTPATIENT
Start: 2022-08-02 | End: 2022-08-08 | Stop reason: HOSPADM

## 2022-08-02 RX ORDER — NALOXONE HCL 0.4 MG/ML
2 VIAL (ML) INJECTION ONCE
Status: COMPLETED | OUTPATIENT
Start: 2022-08-02 | End: 2022-08-02

## 2022-08-02 RX ORDER — DICLOFENAC SODIUM 75 MG/1
75 TABLET, DELAYED RELEASE ORAL 2 TIMES DAILY
Status: ON HOLD | COMMUNITY
End: 2022-08-08 | Stop reason: HOSPADM

## 2022-08-02 RX ORDER — 0.9 % SODIUM CHLORIDE 0.9 %
2 VIAL (ML) INJECTION EVERY 12 HOURS SCHEDULED
Status: DISCONTINUED | OUTPATIENT
Start: 2022-08-02 | End: 2022-08-08 | Stop reason: HOSPADM

## 2022-08-02 RX ORDER — SPIRONOLACTONE 25 MG/1
25 TABLET ORAL DAILY
COMMUNITY

## 2022-08-02 RX ORDER — NICOTINE POLACRILEX 4 MG
30 LOZENGE BUCCAL PRN
Status: DISCONTINUED | OUTPATIENT
Start: 2022-08-02 | End: 2022-08-08 | Stop reason: HOSPADM

## 2022-08-02 RX ORDER — POTASSIUM CHLORIDE 20 MEQ/1
40 TABLET, EXTENDED RELEASE ORAL 2 TIMES DAILY WITH MEALS
Status: DISCONTINUED | OUTPATIENT
Start: 2022-08-02 | End: 2022-08-03

## 2022-08-02 RX ORDER — SODIUM CHLORIDE 9 MG/ML
INJECTION, SOLUTION INTRAVENOUS CONTINUOUS
Status: DISCONTINUED | OUTPATIENT
Start: 2022-08-02 | End: 2022-08-03

## 2022-08-02 RX ORDER — SACUBITRIL AND VALSARTAN 97; 103 MG/1; MG/1
1 TABLET, FILM COATED ORAL 2 TIMES DAILY
Status: ON HOLD | COMMUNITY
End: 2022-08-08 | Stop reason: HOSPADM

## 2022-08-02 RX ORDER — FUROSEMIDE 40 MG/1
40 TABLET ORAL DAILY
Status: ON HOLD | COMMUNITY
End: 2022-08-08 | Stop reason: HOSPADM

## 2022-08-02 RX ORDER — LANOLIN ALCOHOL/MO/W.PET/CERES
9 CREAM (GRAM) TOPICAL NIGHTLY
Status: DISCONTINUED | OUTPATIENT
Start: 2022-08-02 | End: 2022-08-08 | Stop reason: HOSPADM

## 2022-08-02 RX ORDER — HEPARIN SODIUM 5000 [USP'U]/ML
5000 INJECTION, SOLUTION INTRAVENOUS; SUBCUTANEOUS EVERY 8 HOURS SCHEDULED
Status: DISCONTINUED | OUTPATIENT
Start: 2022-08-02 | End: 2022-08-03

## 2022-08-02 RX ORDER — CARVEDILOL 12.5 MG/1
18.75 TABLET ORAL 2 TIMES DAILY
Status: ON HOLD | COMMUNITY
End: 2022-08-08 | Stop reason: HOSPADM

## 2022-08-02 RX ORDER — NICOTINE POLACRILEX 4 MG
15 LOZENGE BUCCAL PRN
Status: DISCONTINUED | OUTPATIENT
Start: 2022-08-02 | End: 2022-08-08 | Stop reason: HOSPADM

## 2022-08-02 RX ORDER — ENOXAPARIN SODIUM 100 MG/ML
40 INJECTION SUBCUTANEOUS DAILY
Status: DISCONTINUED | OUTPATIENT
Start: 2022-08-03 | End: 2022-08-03

## 2022-08-02 RX ADMIN — POTASSIUM CHLORIDE 20 MEQ: 14.9 INJECTION, SOLUTION INTRAVENOUS at 20:56

## 2022-08-02 RX ADMIN — SODIUM CHLORIDE: 9 INJECTION, SOLUTION INTRAVENOUS at 20:50

## 2022-08-02 RX ADMIN — Medication 1 MG/MIN: at 21:11

## 2022-08-02 RX ADMIN — POTASSIUM CHLORIDE 20 MEQ: 14.9 INJECTION, SOLUTION INTRAVENOUS at 18:42

## 2022-08-02 RX ADMIN — MORPHINE SULFATE 4 MG: 2 INJECTION, SOLUTION INTRAMUSCULAR; INTRAVENOUS at 19:25

## 2022-08-02 RX ADMIN — AMIODARONE HYDROCHLORIDE 150 MG: 1.5 INJECTION, SOLUTION INTRAVENOUS at 20:56

## 2022-08-02 RX ADMIN — NALOXONE HYDROCHLORIDE 2 MG: 0.4 INJECTION, SOLUTION INTRAMUSCULAR; INTRAVENOUS; SUBCUTANEOUS at 16:56

## 2022-08-02 ASSESSMENT — ENCOUNTER SYMPTOMS
CHILLS: 0
CHEST TIGHTNESS: 1
POLYDIPSIA: 0
ALLERGIC/IMMUNOLOGIC NEGATIVE: 1
BACK PAIN: 1
POLYPHAGIA: 0
FEVER: 0
COUGH: 0
EYE REDNESS: 0
PHOTOPHOBIA: 0
HALLUCINATIONS: 0
CONFUSION: 1
NAUSEA: 1
AGITATION: 1
DIARRHEA: 1

## 2022-08-02 ASSESSMENT — PAIN SCALES - GENERAL
PAINLEVEL_OUTOF10: 4
PAINLEVEL_OUTOF10: 3

## 2022-08-03 ENCOUNTER — APPOINTMENT (OUTPATIENT)
Dept: CARDIOLOGY | Age: 37
DRG: 225 | End: 2022-08-03
Attending: INTERNAL MEDICINE

## 2022-08-03 LAB
ANION GAP SERPL CALC-SCNC: 8 MMOL/L (ref 7–19)
AORTIC VALVE AREA: NORMAL
ATRIAL RATE (BPM): 124
ATRIAL RATE (BPM): 87
ATRIAL RATE (BPM): 98
AV MEAN GRADIENT (AVMG): NORMAL
AV MEAN VELOCITY (AVMV): NORMAL
AV PEAK GRADIENT (AVPG): NORMAL
AV PEAK VELOCITY (AVPV): NORMAL
AV STENOSIS SEVERITY TEXT: NORMAL
BUN SERPL-MCNC: 16 MG/DL (ref 6–20)
BUN/CREAT SERPL: 18 (ref 7–25)
CALCIUM SERPL-MCNC: 7.9 MG/DL (ref 8.4–10.2)
CHLORIDE SERPL-SCNC: 113 MMOL/L (ref 97–110)
CHOLEST SERPL-MCNC: 220 MG/DL
CHOLEST/HDLC SERPL: 5.5 {RATIO}
CO2 SERPL-SCNC: 23 MMOL/L (ref 21–32)
CREAT SERPL-MCNC: 0.89 MG/DL (ref 0.67–1.17)
E WAVE DECELARATION TIME (MDT): 74
EST RIGHT VENT SYSTOLIC PRESSURE BY TRICUSPID REGURGITATION JET (RVSP): 19
FASTING DURATION TIME PATIENT: ABNORMAL H
FASTING DURATION TIME PATIENT: ABNORMAL H
GFR SERPLBLD BASED ON 1.73 SQ M-ARVRAT: >90 ML/MIN
GLUCOSE BLDC GLUCOMTR-MCNC: 112 MG/DL (ref 70–99)
GLUCOSE BLDC GLUCOMTR-MCNC: 117 MG/DL (ref 70–99)
GLUCOSE BLDC GLUCOMTR-MCNC: 124 MG/DL (ref 70–99)
GLUCOSE BLDC GLUCOMTR-MCNC: 129 MG/DL (ref 70–99)
GLUCOSE BLDC GLUCOMTR-MCNC: 134 MG/DL (ref 70–99)
GLUCOSE SERPL-MCNC: 136 MG/DL (ref 70–99)
HDLC SERPL-MCNC: 40 MG/DL
LDLC SERPL CALC-MCNC: 116 MG/DL
LEFT INTERNAL DIMENSION IN SYSTOLE (LVSD): 6.6
LEFT VENTRICULAR INTERNAL DIMENSION IN DIASTOLE (LVDD): 7.26
LV EF: NORMAL %
MAGNESIUM SERPL-MCNC: 2.2 MG/DL (ref 1.7–2.4)
MV E TISSUE VEL LAT (MELV): 5.59
MV E TISSUE VEL MED (MESV): 5.04
NONHDLC SERPL-MCNC: 180 MG/DL
P AXIS (DEGREES): 35
P AXIS (DEGREES): 41
P AXIS (DEGREES): 58
PHOSPHATE SERPL-MCNC: 2.9 MG/DL (ref 2.4–4.7)
POTASSIUM SERPL-SCNC: 3.8 MMOL/L (ref 3.4–5.1)
PR-INTERVAL (MSEC): 128
PR-INTERVAL (MSEC): 144
PR-INTERVAL (MSEC): 148
QRS-INTERVAL (MSEC): 110
QRS-INTERVAL (MSEC): 116
QRS-INTERVAL (MSEC): 118
QT-INTERVAL (MSEC): 336
QT-INTERVAL (MSEC): 382
QT-INTERVAL (MSEC): 404
QTC: 483
QTC: 486
QTC: 488
R AXIS (DEGREES): 24
R AXIS (DEGREES): 24
R AXIS (DEGREES): 51
RAINBOW EXTRA TUBES HOLD SPECIMEN: NORMAL
REPORT TEXT: NORMAL
SODIUM SERPL-SCNC: 140 MMOL/L (ref 135–145)
T AXIS (DEGREES): -153
T AXIS (DEGREES): -154
T AXIS (DEGREES): -7
TRIGL SERPL-MCNC: 321 MG/DL
TROPONIN I SERPL DL<=0.01 NG/ML-MCNC: 890 NG/L
VENTRICULAR RATE EKG/MIN (BPM): 124
VENTRICULAR RATE EKG/MIN (BPM): 87
VENTRICULAR RATE EKG/MIN (BPM): 98

## 2022-08-03 PROCEDURE — 10002800 HB RX 250 W HCPCS: Performed by: CLINICAL NURSE SPECIALIST

## 2022-08-03 PROCEDURE — 93458 L HRT ARTERY/VENTRICLE ANGIO: CPT | Performed by: INTERNAL MEDICINE

## 2022-08-03 PROCEDURE — 10002803 HB RX 637: Performed by: CLINICAL NURSE SPECIALIST

## 2022-08-03 PROCEDURE — C1769 GUIDE WIRE: HCPCS | Performed by: INTERNAL MEDICINE

## 2022-08-03 PROCEDURE — 4A023N7 MEASUREMENT OF CARDIAC SAMPLING AND PRESSURE, LEFT HEART, PERCUTANEOUS APPROACH: ICD-10-PCS | Performed by: INTERNAL MEDICINE

## 2022-08-03 PROCEDURE — 10002800 HB RX 250 W HCPCS: Performed by: EMERGENCY MEDICINE

## 2022-08-03 PROCEDURE — 10002805 HB CONTRAST AGENT: Performed by: INTERNAL MEDICINE

## 2022-08-03 PROCEDURE — 99152 MOD SED SAME PHYS/QHP 5/>YRS: CPT | Performed by: INTERNAL MEDICINE

## 2022-08-03 PROCEDURE — 83735 ASSAY OF MAGNESIUM: CPT | Performed by: INTERNAL MEDICINE

## 2022-08-03 PROCEDURE — 80061 LIPID PANEL: CPT | Performed by: INTERNAL MEDICINE

## 2022-08-03 PROCEDURE — 10002803 HB RX 637: Performed by: INTERNAL MEDICINE

## 2022-08-03 PROCEDURE — C1894 INTRO/SHEATH, NON-LASER: HCPCS | Performed by: INTERNAL MEDICINE

## 2022-08-03 PROCEDURE — 10004651 HB RX, NO CHARGE ITEM: Performed by: INTERNAL MEDICINE

## 2022-08-03 PROCEDURE — 93005 ELECTROCARDIOGRAM TRACING: CPT | Performed by: INTERNAL MEDICINE

## 2022-08-03 PROCEDURE — 84100 ASSAY OF PHOSPHORUS: CPT | Performed by: INTERNAL MEDICINE

## 2022-08-03 PROCEDURE — 99291 CRITICAL CARE FIRST HOUR: CPT | Performed by: INTERNAL MEDICINE

## 2022-08-03 PROCEDURE — 10006023 HB SUPPLY 272: Performed by: INTERNAL MEDICINE

## 2022-08-03 PROCEDURE — 10002801 HB RX 250 W/O HCPCS: Performed by: INTERNAL MEDICINE

## 2022-08-03 PROCEDURE — 10004281 HB COUNTER-STAFF TIME PER 15 MIN

## 2022-08-03 PROCEDURE — 10002807 HB RX 258: Performed by: INTERNAL MEDICINE

## 2022-08-03 PROCEDURE — B2151ZZ FLUOROSCOPY OF LEFT HEART USING LOW OSMOLAR CONTRAST: ICD-10-PCS | Performed by: INTERNAL MEDICINE

## 2022-08-03 PROCEDURE — 10004651 HB RX, NO CHARGE ITEM

## 2022-08-03 PROCEDURE — 10002800 HB RX 250 W HCPCS: Performed by: INTERNAL MEDICINE

## 2022-08-03 PROCEDURE — 10004651 HB RX, NO CHARGE ITEM: Performed by: CLINICAL NURSE SPECIALIST

## 2022-08-03 PROCEDURE — 10006031 HB ROOM CHARGE TELEMETRY

## 2022-08-03 PROCEDURE — 80048 BASIC METABOLIC PNL TOTAL CA: CPT | Performed by: INTERNAL MEDICINE

## 2022-08-03 PROCEDURE — B2111ZZ FLUOROSCOPY OF MULTIPLE CORONARY ARTERIES USING LOW OSMOLAR CONTRAST: ICD-10-PCS | Performed by: INTERNAL MEDICINE

## 2022-08-03 PROCEDURE — 93306 TTE W/DOPPLER COMPLETE: CPT

## 2022-08-03 RX ORDER — CHLORDIAZEPOXIDE HYDROCHLORIDE 25 MG/1
25 CAPSULE, GELATIN COATED ORAL EVERY 6 HOURS
Status: DISPENSED | OUTPATIENT
Start: 2022-08-03 | End: 2022-08-06

## 2022-08-03 RX ORDER — CARVEDILOL 6.25 MG/1
6.25 TABLET ORAL EVERY 12 HOURS SCHEDULED
Status: DISCONTINUED | OUTPATIENT
Start: 2022-08-03 | End: 2022-08-04

## 2022-08-03 RX ORDER — LIDOCAINE HYDROCHLORIDE 10 MG/ML
INJECTION, SOLUTION EPIDURAL; INFILTRATION; INTRACAUDAL; PERINEURAL PRN
Status: DISCONTINUED | OUTPATIENT
Start: 2022-08-03 | End: 2022-08-03 | Stop reason: HOSPADM

## 2022-08-03 RX ORDER — ENOXAPARIN SODIUM 100 MG/ML
40 INJECTION SUBCUTANEOUS EVERY 12 HOURS
Status: DISCONTINUED | OUTPATIENT
Start: 2022-08-03 | End: 2022-08-08 | Stop reason: HOSPADM

## 2022-08-03 RX ORDER — ENOXAPARIN SODIUM 100 MG/ML
40 INJECTION SUBCUTANEOUS DAILY
Status: CANCELLED | OUTPATIENT
Start: 2022-08-04

## 2022-08-03 RX ORDER — MIDAZOLAM HYDROCHLORIDE 1 MG/ML
INJECTION, SOLUTION INTRAMUSCULAR; INTRAVENOUS PRN
Status: DISCONTINUED | OUTPATIENT
Start: 2022-08-03 | End: 2022-08-03 | Stop reason: HOSPADM

## 2022-08-03 RX ORDER — SPIRONOLACTONE 25 MG/1
25 TABLET ORAL DAILY
Status: DISCONTINUED | OUTPATIENT
Start: 2022-08-03 | End: 2022-08-08 | Stop reason: HOSPADM

## 2022-08-03 RX ORDER — HEPARIN SODIUM 200 [USP'U]/100ML
INJECTION, SOLUTION INTRAVENOUS PRN
Status: DISCONTINUED | OUTPATIENT
Start: 2022-08-03 | End: 2022-08-03 | Stop reason: HOSPADM

## 2022-08-03 RX ORDER — LORAZEPAM 1 MG/1
2 TABLET ORAL EVERY 4 HOURS PRN
Status: DISCONTINUED | OUTPATIENT
Start: 2022-08-03 | End: 2022-08-08 | Stop reason: HOSPADM

## 2022-08-03 RX ORDER — PANTOPRAZOLE SODIUM 40 MG/1
40 TABLET, DELAYED RELEASE ORAL
Status: DISCONTINUED | OUTPATIENT
Start: 2022-08-03 | End: 2022-08-08 | Stop reason: HOSPADM

## 2022-08-03 RX ORDER — SODIUM CHLORIDE 9 MG/ML
INJECTION, SOLUTION INTRAVENOUS CONTINUOUS
Status: CANCELLED | OUTPATIENT
Start: 2022-08-03

## 2022-08-03 RX ORDER — MULTIVIT-MIN/IRON/FOLIC ACID/K 18-600-40
50 CAPSULE ORAL DAILY
COMMUNITY

## 2022-08-03 RX ORDER — ACETAMINOPHEN 500 MG
1000 TABLET ORAL EVERY 6 HOURS PRN
Status: DISCONTINUED | OUTPATIENT
Start: 2022-08-03 | End: 2022-08-08 | Stop reason: HOSPADM

## 2022-08-03 RX ORDER — ACETAMINOPHEN 500 MG
TABLET ORAL
Status: COMPLETED
Start: 2022-08-03 | End: 2022-08-03

## 2022-08-03 RX ORDER — ATORVASTATIN CALCIUM 40 MG/1
40 TABLET, FILM COATED ORAL NIGHTLY
Status: DISCONTINUED | OUTPATIENT
Start: 2022-08-03 | End: 2022-08-08 | Stop reason: HOSPADM

## 2022-08-03 RX ORDER — POTASSIUM CHLORIDE 20 MEQ/1
40 TABLET, EXTENDED RELEASE ORAL ONCE
Status: COMPLETED | OUTPATIENT
Start: 2022-08-03 | End: 2022-08-03

## 2022-08-03 RX ORDER — LORAZEPAM 2 MG/ML
2 INJECTION INTRAMUSCULAR ONCE
Status: COMPLETED | OUTPATIENT
Start: 2022-08-03 | End: 2022-08-03

## 2022-08-03 RX ORDER — LIDOCAINE 4 G/G
3 PATCH TOPICAL DAILY
Status: DISCONTINUED | OUTPATIENT
Start: 2022-08-03 | End: 2022-08-08 | Stop reason: HOSPADM

## 2022-08-03 RX ORDER — LORAZEPAM 2 MG/ML
2 INJECTION INTRAMUSCULAR EVERY 4 HOURS PRN
Status: DISCONTINUED | OUTPATIENT
Start: 2022-08-03 | End: 2022-08-03

## 2022-08-03 RX ORDER — HYDRALAZINE HYDROCHLORIDE 20 MG/ML
10 INJECTION INTRAMUSCULAR; INTRAVENOUS EVERY 4 HOURS PRN
Status: CANCELLED | OUTPATIENT
Start: 2022-08-03 | End: 2022-08-04

## 2022-08-03 RX ORDER — FOLIC ACID 1 MG/1
1 TABLET ORAL DAILY
Status: DISCONTINUED | OUTPATIENT
Start: 2022-08-03 | End: 2022-08-08 | Stop reason: HOSPADM

## 2022-08-03 RX ORDER — SODIUM CHLORIDE 9 MG/ML
INJECTION, SOLUTION INTRAVENOUS CONTINUOUS PRN
Status: DISCONTINUED | OUTPATIENT
Start: 2022-08-03 | End: 2022-08-03

## 2022-08-03 RX ORDER — CLONIDINE HYDROCHLORIDE 0.1 MG/1
0.1 TABLET ORAL EVERY 4 HOURS PRN
Status: CANCELLED | OUTPATIENT
Start: 2022-08-03 | End: 2022-08-04

## 2022-08-03 RX ORDER — FUROSEMIDE 40 MG/1
20 TABLET ORAL DAILY
Status: DISCONTINUED | OUTPATIENT
Start: 2022-08-04 | End: 2022-08-08 | Stop reason: HOSPADM

## 2022-08-03 RX ORDER — ASPIRIN 325 MG
325 TABLET ORAL ONCE
Status: CANCELLED | OUTPATIENT
Start: 2022-08-03 | End: 2022-08-03

## 2022-08-03 RX ADMIN — ACETAMINOPHEN 1000 MG: 500 TABLET, FILM COATED ORAL at 08:19

## 2022-08-03 RX ADMIN — SODIUM CHLORIDE, PRESERVATIVE FREE 2 ML: 5 INJECTION INTRAVENOUS at 21:10

## 2022-08-03 RX ADMIN — CHLORDIAZEPOXIDE HYDROCHLORIDE 25 MG: 25 CAPSULE ORAL at 14:22

## 2022-08-03 RX ADMIN — FOLIC ACID 1 MG: 1 TABLET ORAL at 14:27

## 2022-08-03 RX ADMIN — POTASSIUM CHLORIDE 40 MEQ: 20 TABLET, EXTENDED RELEASE ORAL at 00:31

## 2022-08-03 RX ADMIN — ENOXAPARIN SODIUM 40 MG: 40 INJECTION SUBCUTANEOUS at 11:04

## 2022-08-03 RX ADMIN — LORAZEPAM 2 MG: 2 INJECTION INTRAMUSCULAR; INTRAVENOUS at 04:04

## 2022-08-03 RX ADMIN — Medication 9 MG: at 21:09

## 2022-08-03 RX ADMIN — LIDOCAINE 3 PATCH: 4 PATCH TOPICAL at 14:26

## 2022-08-03 RX ADMIN — ENOXAPARIN SODIUM 40 MG: 40 INJECTION SUBCUTANEOUS at 22:50

## 2022-08-03 RX ADMIN — NYSTATIN: 100000 POWDER TOPICAL at 21:10

## 2022-08-03 RX ADMIN — MORPHINE SULFATE 1 MG: 2 INJECTION, SOLUTION INTRAMUSCULAR; INTRAVENOUS at 23:40

## 2022-08-03 RX ADMIN — ACETAMINOPHEN 1000 MG: 500 TABLET, FILM COATED ORAL at 21:09

## 2022-08-03 RX ADMIN — ASPIRIN 81 MG: 81 TABLET, COATED ORAL at 08:22

## 2022-08-03 RX ADMIN — PANTOPRAZOLE SODIUM 40 MG: 40 TABLET, DELAYED RELEASE ORAL at 14:27

## 2022-08-03 RX ADMIN — SPIRONOLACTONE 25 MG: 25 TABLET ORAL at 08:22

## 2022-08-03 RX ADMIN — ACETAMINOPHEN 1000 MG: 500 TABLET, FILM COATED ORAL at 13:35

## 2022-08-03 RX ADMIN — SACUBITRIL AND VALSARTAN 1 TABLET: 24; 26 TABLET, FILM COATED ORAL at 21:09

## 2022-08-03 RX ADMIN — CARVEDILOL 6.25 MG: 6.25 TABLET, FILM COATED ORAL at 21:09

## 2022-08-03 RX ADMIN — SACUBITRIL AND VALSARTAN 1 TABLET: 24; 26 TABLET, FILM COATED ORAL at 08:22

## 2022-08-03 RX ADMIN — POTASSIUM CHLORIDE 40 MEQ: 20 TABLET, EXTENDED RELEASE ORAL at 08:21

## 2022-08-03 RX ADMIN — SODIUM CHLORIDE, PRESERVATIVE FREE 2 ML: 5 INJECTION INTRAVENOUS at 00:35

## 2022-08-03 RX ADMIN — THIAMINE HCL TAB 100 MG 100 MG: 100 TAB at 14:27

## 2022-08-03 RX ADMIN — CARVEDILOL 6.25 MG: 6.25 TABLET, FILM COATED ORAL at 08:22

## 2022-08-03 RX ADMIN — Medication 9 MG: at 00:31

## 2022-08-03 RX ADMIN — CHLORDIAZEPOXIDE HYDROCHLORIDE 25 MG: 25 CAPSULE ORAL at 21:19

## 2022-08-03 RX ADMIN — Medication 1 MG/MIN: at 04:22

## 2022-08-03 RX ADMIN — ATORVASTATIN CALCIUM 40 MG: 40 TABLET, FILM COATED ORAL at 21:08

## 2022-08-03 RX ADMIN — PERFLUTREN 1.5 ML: 6.52 INJECTION, SUSPENSION INTRAVENOUS at 13:39

## 2022-08-03 RX ADMIN — HEPARIN SODIUM 5000 UNITS: 5000 INJECTION INTRAVENOUS; SUBCUTANEOUS at 00:34

## 2022-08-03 ASSESSMENT — ENCOUNTER SYMPTOMS
ABDOMINAL PAIN: 0
NERVOUS/ANXIOUS: 0
TROUBLE SWALLOWING: 0
GASTROINTESTINAL NEGATIVE: 1
NEUROLOGICAL NEGATIVE: 1
CHEST TIGHTNESS: 1
BLOOD IN STOOL: 0
DIARRHEA: 0
BRUISES/BLEEDS EASILY: 0
FATIGUE: 1
POLYDIPSIA: 0
CONSTITUTIONAL NEGATIVE: 1
VOMITING: 0
NAUSEA: 0
ACTIVITY CHANGE: 1
BACK PAIN: 0
SHORTNESS OF BREATH: 1

## 2022-08-03 ASSESSMENT — PAIN SCALES - GENERAL
PAINLEVEL_OUTOF10: 10
PAINLEVEL_OUTOF10: 10
PAINLEVEL_OUTOF10: 5
PAINLEVEL_OUTOF10: 7
PAINLEVEL_OUTOF10: 5
PAINLEVEL_OUTOF10: 0
PAINLEVEL_OUTOF10: 4
PAINLEVEL_OUTOF10: 6
PAINLEVEL_OUTOF10: 5
PAINLEVEL_OUTOF10: 0
PAINLEVEL_OUTOF10: 5
PAINLEVEL_OUTOF10: 3

## 2022-08-04 LAB
ANION GAP SERPL CALC-SCNC: 7 MMOL/L (ref 7–19)
ATRIAL RATE (BPM): 88
ATRIAL RATE (BPM): 95
BUN SERPL-MCNC: 13 MG/DL (ref 6–20)
BUN/CREAT SERPL: 16 (ref 7–25)
CALCIUM SERPL-MCNC: 8.6 MG/DL (ref 8.4–10.2)
CHLORIDE SERPL-SCNC: 109 MMOL/L (ref 97–110)
CO2 SERPL-SCNC: 24 MMOL/L (ref 21–32)
CREAT SERPL-MCNC: 0.83 MG/DL (ref 0.67–1.17)
FASTING DURATION TIME PATIENT: ABNORMAL H
GFR SERPLBLD BASED ON 1.73 SQ M-ARVRAT: >90 ML/MIN
GLUCOSE BLDC GLUCOMTR-MCNC: 104 MG/DL (ref 70–99)
GLUCOSE BLDC GLUCOMTR-MCNC: 122 MG/DL (ref 70–99)
GLUCOSE BLDC GLUCOMTR-MCNC: 128 MG/DL (ref 70–99)
GLUCOSE BLDC GLUCOMTR-MCNC: 155 MG/DL (ref 70–99)
GLUCOSE SERPL-MCNC: 113 MG/DL (ref 70–99)
HBA1C MFR BLD: 6 % (ref 4.5–5.6)
HGB BLD-MCNC: 14.2 G/DL (ref 13–17)
MAGNESIUM SERPL-MCNC: 2.4 MG/DL (ref 1.7–2.4)
NRBC BLD MANUAL-RTO: 0 /100 WBC
P AXIS (DEGREES): 44
P AXIS (DEGREES): 64
PHOSPHATE SERPL-MCNC: 2 MG/DL (ref 2.4–4.7)
PLATELET # BLD AUTO: 259 K/MCL (ref 140–450)
POTASSIUM SERPL-SCNC: 3.9 MMOL/L (ref 3.4–5.1)
PR-INTERVAL (MSEC): 144
PR-INTERVAL (MSEC): 150
QRS-INTERVAL (MSEC): 110
QRS-INTERVAL (MSEC): 112
QT-INTERVAL (MSEC): 394
QT-INTERVAL (MSEC): 420
QTC: 495
QTC: 508
R AXIS (DEGREES): -3
R AXIS (DEGREES): 20
RAINBOW EXTRA TUBES HOLD SPECIMEN: NORMAL
REPORT TEXT: NORMAL
REPORT TEXT: NORMAL
SODIUM SERPL-SCNC: 136 MMOL/L (ref 135–145)
T AXIS (DEGREES): 38
T AXIS (DEGREES): 40
VENTRICULAR RATE EKG/MIN (BPM): 88
VENTRICULAR RATE EKG/MIN (BPM): 95
WBC # BLD: 11.6 K/MCL (ref 4.2–11)

## 2022-08-04 PROCEDURE — 80048 BASIC METABOLIC PNL TOTAL CA: CPT | Performed by: CLINICAL NURSE SPECIALIST

## 2022-08-04 PROCEDURE — 10002800 HB RX 250 W HCPCS: Performed by: CLINICAL NURSE SPECIALIST

## 2022-08-04 PROCEDURE — 85018 HEMOGLOBIN: CPT | Performed by: CLINICAL NURSE SPECIALIST

## 2022-08-04 PROCEDURE — 84100 ASSAY OF PHOSPHORUS: CPT | Performed by: CLINICAL NURSE SPECIALIST

## 2022-08-04 PROCEDURE — 10002803 HB RX 637: Performed by: FAMILY MEDICINE

## 2022-08-04 PROCEDURE — 10006031 HB ROOM CHARGE TELEMETRY

## 2022-08-04 PROCEDURE — 10004651 HB RX, NO CHARGE ITEM: Performed by: CLINICAL NURSE SPECIALIST

## 2022-08-04 PROCEDURE — 36415 COLL VENOUS BLD VENIPUNCTURE: CPT | Performed by: CLINICAL NURSE SPECIALIST

## 2022-08-04 PROCEDURE — 85048 AUTOMATED LEUKOCYTE COUNT: CPT | Performed by: CLINICAL NURSE SPECIALIST

## 2022-08-04 PROCEDURE — 10002803 HB RX 637: Performed by: INTERNAL MEDICINE

## 2022-08-04 PROCEDURE — 10002800 HB RX 250 W HCPCS: Performed by: INTERNAL MEDICINE

## 2022-08-04 PROCEDURE — 10002803 HB RX 637: Performed by: CLINICAL NURSE SPECIALIST

## 2022-08-04 PROCEDURE — 93005 ELECTROCARDIOGRAM TRACING: CPT | Performed by: INTERNAL MEDICINE

## 2022-08-04 PROCEDURE — 85049 AUTOMATED PLATELET COUNT: CPT | Performed by: CLINICAL NURSE SPECIALIST

## 2022-08-04 PROCEDURE — HZ2ZZZZ DETOXIFICATION SERVICES FOR SUBSTANCE ABUSE TREATMENT: ICD-10-PCS | Performed by: INTERNAL MEDICINE

## 2022-08-04 PROCEDURE — 10004651 HB RX, NO CHARGE ITEM: Performed by: INTERNAL MEDICINE

## 2022-08-04 PROCEDURE — 83735 ASSAY OF MAGNESIUM: CPT | Performed by: CLINICAL NURSE SPECIALIST

## 2022-08-04 RX ORDER — ALPRAZOLAM 0.25 MG/1
0.25 TABLET ORAL
Status: DISCONTINUED | OUTPATIENT
Start: 2022-08-04 | End: 2022-08-08 | Stop reason: HOSPADM

## 2022-08-04 RX ORDER — CARVEDILOL 12.5 MG/1
12.5 TABLET ORAL EVERY 12 HOURS SCHEDULED
Status: DISCONTINUED | OUTPATIENT
Start: 2022-08-04 | End: 2022-08-08 | Stop reason: HOSPADM

## 2022-08-04 RX ORDER — POTASSIUM CHLORIDE 20 MEQ/1
40 TABLET, EXTENDED RELEASE ORAL
Status: DISCONTINUED | OUTPATIENT
Start: 2022-08-04 | End: 2022-08-06

## 2022-08-04 RX ORDER — LORAZEPAM 2 MG/ML
3 INJECTION INTRAMUSCULAR
Status: DISCONTINUED | OUTPATIENT
Start: 2022-08-04 | End: 2022-08-08 | Stop reason: HOSPADM

## 2022-08-04 RX ORDER — LORAZEPAM 1 MG/1
4 TABLET ORAL
Status: DISCONTINUED | OUTPATIENT
Start: 2022-08-04 | End: 2022-08-08 | Stop reason: HOSPADM

## 2022-08-04 RX ORDER — MELOXICAM 7.5 MG/1
7.5 TABLET ORAL DAILY
Status: DISCONTINUED | OUTPATIENT
Start: 2022-08-04 | End: 2022-08-04

## 2022-08-04 RX ORDER — LORAZEPAM 1 MG/1
2 TABLET ORAL
Status: DISCONTINUED | OUTPATIENT
Start: 2022-08-04 | End: 2022-08-08 | Stop reason: HOSPADM

## 2022-08-04 RX ORDER — LORAZEPAM 2 MG/ML
4 INJECTION INTRAMUSCULAR
Status: DISCONTINUED | OUTPATIENT
Start: 2022-08-04 | End: 2022-08-08 | Stop reason: HOSPADM

## 2022-08-04 RX ORDER — TRAMADOL HYDROCHLORIDE 50 MG/1
50 TABLET ORAL EVERY 6 HOURS PRN
Status: DISCONTINUED | OUTPATIENT
Start: 2022-08-04 | End: 2022-08-06

## 2022-08-04 RX ORDER — LORAZEPAM 2 MG/ML
2 INJECTION INTRAMUSCULAR
Status: DISCONTINUED | OUTPATIENT
Start: 2022-08-04 | End: 2022-08-08 | Stop reason: HOSPADM

## 2022-08-04 RX ORDER — LORAZEPAM 1 MG/1
3 TABLET ORAL
Status: DISCONTINUED | OUTPATIENT
Start: 2022-08-04 | End: 2022-08-08 | Stop reason: HOSPADM

## 2022-08-04 RX ORDER — MELOXICAM 7.5 MG/1
15 TABLET ORAL DAILY
Status: DISCONTINUED | OUTPATIENT
Start: 2022-08-05 | End: 2022-08-08 | Stop reason: HOSPADM

## 2022-08-04 RX ADMIN — NYSTATIN: 100000 POWDER TOPICAL at 20:15

## 2022-08-04 RX ADMIN — ACETAMINOPHEN 1000 MG: 500 TABLET, FILM COATED ORAL at 02:05

## 2022-08-04 RX ADMIN — DIBASIC SODIUM PHOSPHATE, MONOBASIC POTASSIUM PHOSPHATE AND MONOBASIC SODIUM PHOSPHATE 250 MG: 852; 155; 130 TABLET ORAL at 23:01

## 2022-08-04 RX ADMIN — SODIUM CHLORIDE, PRESERVATIVE FREE 2 ML: 5 INJECTION INTRAVENOUS at 09:39

## 2022-08-04 RX ADMIN — LIDOCAINE 3 PATCH: 4 PATCH TOPICAL at 09:29

## 2022-08-04 RX ADMIN — SODIUM CHLORIDE, PRESERVATIVE FREE 2 ML: 5 INJECTION INTRAVENOUS at 20:14

## 2022-08-04 RX ADMIN — TRAMADOL HYDROCHLORIDE 50 MG: 50 TABLET, COATED ORAL at 16:52

## 2022-08-04 RX ADMIN — INSULIN LISPRO 1 UNITS: 100 INJECTION, SOLUTION INTRAVENOUS; SUBCUTANEOUS at 18:22

## 2022-08-04 RX ADMIN — CHLORDIAZEPOXIDE HYDROCHLORIDE 25 MG: 25 CAPSULE ORAL at 02:05

## 2022-08-04 RX ADMIN — CARVEDILOL 6.25 MG: 6.25 TABLET, FILM COATED ORAL at 09:26

## 2022-08-04 RX ADMIN — CHLORDIAZEPOXIDE HYDROCHLORIDE 25 MG: 25 CAPSULE ORAL at 14:38

## 2022-08-04 RX ADMIN — SPIRONOLACTONE 25 MG: 25 TABLET ORAL at 09:25

## 2022-08-04 RX ADMIN — ACETAMINOPHEN 1000 MG: 500 TABLET, FILM COATED ORAL at 13:46

## 2022-08-04 RX ADMIN — FOLIC ACID 1 MG: 1 TABLET ORAL at 09:25

## 2022-08-04 RX ADMIN — PANTOPRAZOLE SODIUM 40 MG: 40 TABLET, DELAYED RELEASE ORAL at 09:26

## 2022-08-04 RX ADMIN — NYSTATIN: 100000 POWDER TOPICAL at 11:04

## 2022-08-04 RX ADMIN — LORAZEPAM 2 MG: 1 TABLET ORAL at 02:05

## 2022-08-04 RX ADMIN — POTASSIUM CHLORIDE 40 MEQ: 20 TABLET, EXTENDED RELEASE ORAL at 12:57

## 2022-08-04 RX ADMIN — CHLORDIAZEPOXIDE HYDROCHLORIDE 25 MG: 25 CAPSULE ORAL at 20:15

## 2022-08-04 RX ADMIN — MELOXICAM 7.5 MG: 7.5 TABLET ORAL at 11:04

## 2022-08-04 RX ADMIN — Medication 9 MG: at 23:01

## 2022-08-04 RX ADMIN — ASPIRIN 81 MG: 81 TABLET, COATED ORAL at 09:25

## 2022-08-04 RX ADMIN — SACUBITRIL AND VALSARTAN 1 TABLET: 49; 51 TABLET, FILM COATED ORAL at 20:24

## 2022-08-04 RX ADMIN — CARVEDILOL 12.5 MG: 12.5 TABLET, FILM COATED ORAL at 20:15

## 2022-08-04 RX ADMIN — ENOXAPARIN SODIUM 40 MG: 40 INJECTION SUBCUTANEOUS at 11:04

## 2022-08-04 RX ADMIN — ACETAMINOPHEN 1000 MG: 500 TABLET, FILM COATED ORAL at 08:20

## 2022-08-04 RX ADMIN — TRAMADOL HYDROCHLORIDE 50 MG: 50 TABLET, COATED ORAL at 23:01

## 2022-08-04 RX ADMIN — DIBASIC SODIUM PHOSPHATE, MONOBASIC POTASSIUM PHOSPHATE AND MONOBASIC SODIUM PHOSPHATE 250 MG: 852; 155; 130 TABLET ORAL at 12:57

## 2022-08-04 RX ADMIN — FUROSEMIDE 20 MG: 40 TABLET ORAL at 09:26

## 2022-08-04 RX ADMIN — CHLORDIAZEPOXIDE HYDROCHLORIDE 25 MG: 25 CAPSULE ORAL at 09:25

## 2022-08-04 RX ADMIN — THIAMINE HCL TAB 100 MG 100 MG: 100 TAB at 09:25

## 2022-08-04 RX ADMIN — LORAZEPAM 2 MG: 1 TABLET ORAL at 07:58

## 2022-08-04 RX ADMIN — SACUBITRIL AND VALSARTAN 1 TABLET: 24; 26 TABLET, FILM COATED ORAL at 09:25

## 2022-08-04 RX ADMIN — ATORVASTATIN CALCIUM 40 MG: 40 TABLET, FILM COATED ORAL at 20:15

## 2022-08-04 ASSESSMENT — COGNITIVE AND FUNCTIONAL STATUS - GENERAL
ARE YOU DEAF OR DO YOU HAVE SERIOUS DIFFICULTY  HEARING: NO
ARE YOU BLIND OR DO YOU HAVE SERIOUS DIFFICULTY SEEING, EVEN WHEN WEARING GLASSES: YES
BECAUSE OF A PHYSICAL, MENTAL, OR EMOTIONAL CONDITION, DO YOU HAVE DIFFICULTY DOING ERRANDS ALONE: NO
DO YOU HAVE DIFFICULTY DRESSING OR BATHING: NO
BECAUSE OF A PHYSICAL, MENTAL, OR EMOTIONAL CONDITION, DO YOU HAVE SERIOUS DIFFICULTY CONCENTRATING, REMEMBERING OR MAKING DECISIONS: NO
DO YOU HAVE SERIOUS DIFFICULTY WALKING OR CLIMBING STAIRS: NO

## 2022-08-04 ASSESSMENT — LIFESTYLE VARIABLES
PAROXYSMAL SWEATS: NO SWEAT VISIBLE
AUDITORY DISTURBANCES: NOT PRESENT
PAROXYSMAL SWEATS: NO SWEAT VISIBLE
PAROXYSMAL SWEATS: BARELY PERCEPTIBLE SWEATING, PALMS MOIST
NAUSEA AND VOMITING: NO NAUSEA AND NO VOMITING
ANXIETY: MILDLY ANXIOUS
HOW OFTEN DO YOU HAVE A DRINK CONTAINING ALCOHOL: 4 OR MORE TIMES PER WEEK
NAUSEA AND VOMITING: NO NAUSEA AND NO VOMITING
AGITATION: NORMAL ACTIVITY
HEADACHE, FULLNESS IN HEAD: NOT PRESENT
HEADACHE, FULLNESS IN HEAD: NOT PRESENT
NAUSEA AND VOMITING: NO NAUSEA AND NO VOMITING
HEADACHE, FULLNESS IN HEAD: NOT PRESENT
TREMOR: NO TREMOR
HEADACHE, FULLNESS IN HEAD: NOT PRESENT
NAUSEA AND VOMITING: NO NAUSEA AND NO VOMITING
AGITATION: NORMAL ACTIVITY
PAROXYSMAL SWEATS: NO SWEAT VISIBLE
ANXIETY: MILDLY ANXIOUS
ANXIETY: MILDLY ANXIOUS
HEADACHE, FULLNESS IN HEAD: NOT PRESENT
TREMOR: NO TREMOR
TREMOR: NO TREMOR
AUDITORY DISTURBANCES: NOT PRESENT
VISUAL DISTURBANCES: NOT PRESENT
TREMOR: NO TREMOR
NAUSEA AND VOMITING: NO NAUSEA AND NO VOMITING
HEADACHE, FULLNESS IN HEAD: NOT PRESENT
VISUAL DISTURBANCES: NOT PRESENT
AGITATION: NORMAL ACTIVITY
ANXIETY: MILDLY ANXIOUS
AGITATION: NORMAL ACTIVITY
AUDITORY DISTURBANCES: NOT PRESENT
HOW MANY STANDARD DRINKS CONTAINING ALCOHOL DO YOU HAVE ON A TYPICAL DAY: 3 OR 4
AUDITORY DISTURBANCES: NOT PRESENT
AUDITORY DISTURBANCES: NOT PRESENT
NAUSEA AND VOMITING: NO NAUSEA AND NO VOMITING
AGITATION: NORMAL ACTIVITY
TREMOR: NO TREMOR
PAROXYSMAL SWEATS: NO SWEAT VISIBLE
PAROXYSMAL SWEATS: NO SWEAT VISIBLE
TREMOR: NO TREMOR
ANXIETY: MILDLY ANXIOUS
VISUAL DISTURBANCES: NOT PRESENT
AUDIT-C TOTAL SCORE: 8
ANXIETY: NO ANXIETY, AT EASE
HOW OFTEN DO YOU HAVE 6 OR MORE DRINKS ON ONE OCCASION: WEEKLY
ALCOHOL_USE_STATUS: NO OR LOW RISK WITH VALIDATED TOOL
AUDITORY DISTURBANCES: NOT PRESENT
VISUAL DISTURBANCES: NOT PRESENT
PAROXYSMAL SWEATS: NO SWEAT VISIBLE
VISUAL DISTURBANCES: NOT PRESENT
AGITATION: NORMAL ACTIVITY
AUDITORY DISTURBANCES: NOT PRESENT
AGITATION: NORMAL ACTIVITY
HEADACHE, FULLNESS IN HEAD: NOT PRESENT
TREMOR: NO TREMOR
ANXIETY: NO ANXIETY, AT EASE
NAUSEA AND VOMITING: 2

## 2022-08-04 ASSESSMENT — ACTIVITIES OF DAILY LIVING (ADL)
ADL_SCORE: 12
RECENT_DECLINE_ADL: NO
ADL_BEFORE_ADMISSION: INDEPENDENT
ADL_SHORT_OF_BREATH: NO
CHRONIC_PAIN_PRESENT: NO

## 2022-08-04 ASSESSMENT — PAIN SCALES - GENERAL
PAINLEVEL_OUTOF10: 10
PAINLEVEL_OUTOF10: 10
PAINLEVEL_OUTOF10: 9
PAINLEVEL_OUTOF10: 10
PAINLEVEL_OUTOF10: 4
PAINLEVEL_OUTOF10: 10
PAINLEVEL_OUTOF10: 2
PAINLEVEL_OUTOF10: 5
PAINLEVEL_OUTOF10: 7
PAINLEVEL_OUTOF10: 0

## 2022-08-05 ENCOUNTER — APPOINTMENT (OUTPATIENT)
Dept: GENERAL RADIOLOGY | Age: 37
DRG: 225 | End: 2022-08-05
Attending: INTERNAL MEDICINE

## 2022-08-05 LAB
ALBUMIN SERPL-MCNC: 2.9 G/DL (ref 3.6–5.1)
ALBUMIN/GLOB SERPL: 0.7 {RATIO} (ref 1–2.4)
ALP SERPL-CCNC: 66 UNITS/L (ref 45–117)
ALT SERPL-CCNC: 72 UNITS/L
ANION GAP SERPL CALC-SCNC: 8 MMOL/L (ref 7–19)
AST SERPL-CCNC: 23 UNITS/L
ATRIAL RATE (BPM): 82
ATRIAL RATE (BPM): 87
BASOPHILS # BLD: 0 K/MCL (ref 0–0.3)
BASOPHILS NFR BLD: 0 %
BILIRUB SERPL-MCNC: 0.5 MG/DL (ref 0.2–1)
BUN SERPL-MCNC: 13 MG/DL (ref 6–20)
BUN/CREAT SERPL: 17 (ref 7–25)
CALCIUM SERPL-MCNC: 8.7 MG/DL (ref 8.4–10.2)
CHLORIDE SERPL-SCNC: 108 MMOL/L (ref 97–110)
CO2 SERPL-SCNC: 25 MMOL/L (ref 21–32)
CREAT SERPL-MCNC: 0.77 MG/DL (ref 0.67–1.17)
DEPRECATED RDW RBC: 46 FL (ref 39–50)
EOSINOPHIL # BLD: 0.1 K/MCL (ref 0–0.5)
EOSINOPHIL NFR BLD: 1 %
ERYTHROCYTE [DISTWIDTH] IN BLOOD: 14.9 % (ref 11–15)
FASTING DURATION TIME PATIENT: ABNORMAL H
GFR SERPLBLD BASED ON 1.73 SQ M-ARVRAT: >90 ML/MIN
GLOBULIN SER-MCNC: 4 G/DL (ref 2–4)
GLUCOSE BLDC GLUCOMTR-MCNC: 105 MG/DL (ref 70–99)
GLUCOSE BLDC GLUCOMTR-MCNC: 106 MG/DL (ref 70–99)
GLUCOSE BLDC GLUCOMTR-MCNC: 111 MG/DL (ref 70–99)
GLUCOSE SERPL-MCNC: 127 MG/DL (ref 70–99)
HCT VFR BLD CALC: 43.3 % (ref 39–51)
HGB BLD-MCNC: 13.8 G/DL (ref 13–17)
IMM GRANULOCYTES # BLD AUTO: 0.1 K/MCL (ref 0–0.2)
IMM GRANULOCYTES # BLD: 1 %
LYMPHOCYTES # BLD: 2.8 K/MCL (ref 1–4.8)
LYMPHOCYTES NFR BLD: 29 %
MAGNESIUM SERPL-MCNC: 2.1 MG/DL (ref 1.7–2.4)
MCH RBC QN AUTO: 26.8 PG (ref 26–34)
MCHC RBC AUTO-ENTMCNC: 31.9 G/DL (ref 32–36.5)
MCV RBC AUTO: 84.1 FL (ref 78–100)
MONOCYTES # BLD: 1.1 K/MCL (ref 0.3–0.9)
MONOCYTES NFR BLD: 11 %
NEUTROPHILS # BLD: 5.6 K/MCL (ref 1.8–7.7)
NEUTROPHILS NFR BLD: 58 %
NRBC BLD MANUAL-RTO: 0 /100 WBC
P AXIS (DEGREES): 40
P AXIS (DEGREES): 51
PHOSPHATE SERPL-MCNC: 2.9 MG/DL (ref 2.4–4.7)
PLATELET # BLD AUTO: 279 K/MCL (ref 140–450)
POTASSIUM SERPL-SCNC: 3.5 MMOL/L (ref 3.4–5.1)
PR-INTERVAL (MSEC): 142
PR-INTERVAL (MSEC): 154
PROT SERPL-MCNC: 6.9 G/DL (ref 6.4–8.2)
QRS-INTERVAL (MSEC): 110
QRS-INTERVAL (MSEC): 114
QT-INTERVAL (MSEC): 376
QT-INTERVAL (MSEC): 406
QTC: 452
QTC: 474
R AXIS (DEGREES): 32
R AXIS (DEGREES): 6
RBC # BLD: 5.15 MIL/MCL (ref 4.5–5.9)
REPORT TEXT: NORMAL
REPORT TEXT: NORMAL
SARS-COV-2 RNA RESP QL NAA+PROBE: NOT DETECTED
SERVICE CMNT-IMP: NORMAL
SERVICE CMNT-IMP: NORMAL
SODIUM SERPL-SCNC: 137 MMOL/L (ref 135–145)
T AXIS (DEGREES): -142
T AXIS (DEGREES): 85
VENTRICULAR RATE EKG/MIN (BPM): 82
VENTRICULAR RATE EKG/MIN (BPM): 87
WBC # BLD: 9.7 K/MCL (ref 4.2–11)

## 2022-08-05 PROCEDURE — 33249 INSJ/RPLCMT DEFIB W/LEAD(S): CPT | Performed by: INTERNAL MEDICINE

## 2022-08-05 PROCEDURE — 99152 MOD SED SAME PHYS/QHP 5/>YRS: CPT | Performed by: INTERNAL MEDICINE

## 2022-08-05 PROCEDURE — 80053 COMPREHEN METABOLIC PANEL: CPT | Performed by: INTERNAL MEDICINE

## 2022-08-05 PROCEDURE — 10006023 HB SUPPLY 272: Performed by: INTERNAL MEDICINE

## 2022-08-05 PROCEDURE — 10004651 HB RX, NO CHARGE ITEM: Performed by: INTERNAL MEDICINE

## 2022-08-05 PROCEDURE — 10002800 HB RX 250 W HCPCS: Performed by: INTERNAL MEDICINE

## 2022-08-05 PROCEDURE — 84100 ASSAY OF PHOSPHORUS: CPT | Performed by: INTERNAL MEDICINE

## 2022-08-05 PROCEDURE — C1894 INTRO/SHEATH, NON-LASER: HCPCS | Performed by: INTERNAL MEDICINE

## 2022-08-05 PROCEDURE — 10002801 HB RX 250 W/O HCPCS: Performed by: INTERNAL MEDICINE

## 2022-08-05 PROCEDURE — 87635 SARS-COV-2 COVID-19 AMP PRB: CPT | Performed by: INTERNAL MEDICINE

## 2022-08-05 PROCEDURE — 13003289 HB OXYGEN THERAPY DAILY

## 2022-08-05 PROCEDURE — 93005 ELECTROCARDIOGRAM TRACING: CPT | Performed by: INTERNAL MEDICINE

## 2022-08-05 PROCEDURE — 71045 X-RAY EXAM CHEST 1 VIEW: CPT

## 2022-08-05 PROCEDURE — 36415 COLL VENOUS BLD VENIPUNCTURE: CPT | Performed by: INTERNAL MEDICINE

## 2022-08-05 PROCEDURE — C1722 AICD, SINGLE CHAMBER: HCPCS | Performed by: INTERNAL MEDICINE

## 2022-08-05 PROCEDURE — 10006025 HB SUPPLY 275: Performed by: INTERNAL MEDICINE

## 2022-08-05 PROCEDURE — 10006031 HB ROOM CHARGE TELEMETRY

## 2022-08-05 PROCEDURE — C1777 LEAD, AICD, ENDO SINGLE COIL: HCPCS | Performed by: INTERNAL MEDICINE

## 2022-08-05 PROCEDURE — 10002803 HB RX 637: Performed by: INTERNAL MEDICINE

## 2022-08-05 PROCEDURE — 83735 ASSAY OF MAGNESIUM: CPT | Performed by: INTERNAL MEDICINE

## 2022-08-05 PROCEDURE — 10004651 HB RX, NO CHARGE ITEM: Performed by: CLINICAL NURSE SPECIALIST

## 2022-08-05 PROCEDURE — 10002807 HB RX 258: Performed by: INTERNAL MEDICINE

## 2022-08-05 PROCEDURE — 0JH608Z INSERTION OF DEFIBRILLATOR GENERATOR INTO CHEST SUBCUTANEOUS TISSUE AND FASCIA, OPEN APPROACH: ICD-10-PCS | Performed by: INTERNAL MEDICINE

## 2022-08-05 PROCEDURE — 02HK3KZ INSERTION OF DEFIBRILLATOR LEAD INTO RIGHT VENTRICLE, PERCUTANEOUS APPROACH: ICD-10-PCS | Performed by: INTERNAL MEDICINE

## 2022-08-05 PROCEDURE — 10002803 HB RX 637: Performed by: CLINICAL NURSE SPECIALIST

## 2022-08-05 PROCEDURE — 85025 COMPLETE CBC W/AUTO DIFF WBC: CPT | Performed by: INTERNAL MEDICINE

## 2022-08-05 PROCEDURE — 99153 MOD SED SAME PHYS/QHP EA: CPT | Performed by: INTERNAL MEDICINE

## 2022-08-05 DEVICE — ICD-VR DVFB1D4 VISIA AF MRI US DF4
Type: IMPLANTABLE DEVICE | Site: CHEST | Status: FUNCTIONAL
Brand: VISIA AF MRI™ VR SURESCAN™

## 2022-08-05 DEVICE — LEAD 6935M62 QUATTRO SECURE S MRI US
Type: IMPLANTABLE DEVICE | Site: VENTRICLE | Status: FUNCTIONAL
Brand: SPRINT QUATTRO SECURE S MRI™ SURESCAN™

## 2022-08-05 RX ORDER — FUROSEMIDE 10 MG/ML
40 INJECTION INTRAMUSCULAR; INTRAVENOUS ONCE
Status: COMPLETED | OUTPATIENT
Start: 2022-08-05 | End: 2022-08-05

## 2022-08-05 RX ORDER — LIDOCAINE HYDROCHLORIDE 10 MG/ML
INJECTION, SOLUTION EPIDURAL; INFILTRATION; INTRACAUDAL; PERINEURAL PRN
Status: DISCONTINUED | OUTPATIENT
Start: 2022-08-05 | End: 2022-08-05 | Stop reason: HOSPADM

## 2022-08-05 RX ORDER — MIDAZOLAM HYDROCHLORIDE 1 MG/ML
INJECTION, SOLUTION INTRAMUSCULAR; INTRAVENOUS PRN
Status: DISCONTINUED | OUTPATIENT
Start: 2022-08-05 | End: 2022-08-05 | Stop reason: HOSPADM

## 2022-08-05 RX ORDER — SODIUM CHLORIDE 9 MG/ML
INJECTION, SOLUTION INTRAVENOUS CONTINUOUS PRN
Status: DISCONTINUED | OUTPATIENT
Start: 2022-08-05 | End: 2022-08-08 | Stop reason: HOSPADM

## 2022-08-05 RX ORDER — POTASSIUM CHLORIDE 20 MEQ/1
40 TABLET, EXTENDED RELEASE ORAL ONCE
Status: COMPLETED | OUTPATIENT
Start: 2022-08-05 | End: 2022-08-05

## 2022-08-05 RX ORDER — DIPHENHYDRAMINE HYDROCHLORIDE 50 MG/ML
INJECTION INTRAMUSCULAR; INTRAVENOUS PRN
Status: DISCONTINUED | OUTPATIENT
Start: 2022-08-05 | End: 2022-08-05 | Stop reason: HOSPADM

## 2022-08-05 RX ORDER — CEFAZOLIN SODIUM 1 G/3ML
INJECTION, POWDER, FOR SOLUTION INTRAMUSCULAR; INTRAVENOUS PRN
Status: DISCONTINUED | OUTPATIENT
Start: 2022-08-05 | End: 2022-08-05 | Stop reason: HOSPADM

## 2022-08-05 RX ADMIN — THIAMINE HCL TAB 100 MG 100 MG: 100 TAB at 08:14

## 2022-08-05 RX ADMIN — FUROSEMIDE 40 MG: 10 INJECTION, SOLUTION INTRAMUSCULAR; INTRAVENOUS at 18:55

## 2022-08-05 RX ADMIN — POTASSIUM CHLORIDE 40 MEQ: 20 TABLET, EXTENDED RELEASE ORAL at 18:55

## 2022-08-05 RX ADMIN — ALPRAZOLAM 0.25 MG: 0.25 TABLET ORAL at 02:40

## 2022-08-05 RX ADMIN — CARVEDILOL 12.5 MG: 12.5 TABLET, FILM COATED ORAL at 20:16

## 2022-08-05 RX ADMIN — POTASSIUM CHLORIDE 40 MEQ: 20 TABLET, EXTENDED RELEASE ORAL at 08:14

## 2022-08-05 RX ADMIN — ATORVASTATIN CALCIUM 40 MG: 40 TABLET, FILM COATED ORAL at 20:17

## 2022-08-05 RX ADMIN — DIBASIC SODIUM PHOSPHATE, MONOBASIC POTASSIUM PHOSPHATE AND MONOBASIC SODIUM PHOSPHATE 250 MG: 852; 155; 130 TABLET ORAL at 21:03

## 2022-08-05 RX ADMIN — CHLORDIAZEPOXIDE HYDROCHLORIDE 25 MG: 25 CAPSULE ORAL at 02:41

## 2022-08-05 RX ADMIN — LIDOCAINE 3 PATCH: 4 PATCH TOPICAL at 08:17

## 2022-08-05 RX ADMIN — ASPIRIN 81 MG: 81 TABLET, COATED ORAL at 08:13

## 2022-08-05 RX ADMIN — TRAMADOL HYDROCHLORIDE 50 MG: 50 TABLET, COATED ORAL at 15:52

## 2022-08-05 RX ADMIN — Medication 9 MG: at 20:16

## 2022-08-05 RX ADMIN — NYSTATIN: 100000 POWDER TOPICAL at 08:18

## 2022-08-05 RX ADMIN — FUROSEMIDE 20 MG: 40 TABLET ORAL at 08:13

## 2022-08-05 RX ADMIN — TRAMADOL HYDROCHLORIDE 50 MG: 50 TABLET, COATED ORAL at 05:00

## 2022-08-05 RX ADMIN — DIBASIC SODIUM PHOSPHATE, MONOBASIC POTASSIUM PHOSPHATE AND MONOBASIC SODIUM PHOSPHATE 250 MG: 852; 155; 130 TABLET ORAL at 08:26

## 2022-08-05 RX ADMIN — SACUBITRIL AND VALSARTAN 1 TABLET: 49; 51 TABLET, FILM COATED ORAL at 08:13

## 2022-08-05 RX ADMIN — MELOXICAM 15 MG: 7.5 TABLET ORAL at 08:13

## 2022-08-05 RX ADMIN — CHLORDIAZEPOXIDE HYDROCHLORIDE 25 MG: 25 CAPSULE ORAL at 20:16

## 2022-08-05 RX ADMIN — MORPHINE SULFATE 2 MG: 2 INJECTION, SOLUTION INTRAMUSCULAR; INTRAVENOUS at 17:54

## 2022-08-05 RX ADMIN — CARVEDILOL 12.5 MG: 12.5 TABLET, FILM COATED ORAL at 08:14

## 2022-08-05 RX ADMIN — SPIRONOLACTONE 25 MG: 25 TABLET ORAL at 08:13

## 2022-08-05 RX ADMIN — ACETAMINOPHEN 1000 MG: 500 TABLET, FILM COATED ORAL at 02:41

## 2022-08-05 RX ADMIN — ALPRAZOLAM 0.25 MG: 0.25 TABLET ORAL at 23:31

## 2022-08-05 RX ADMIN — FOLIC ACID 1 MG: 1 TABLET ORAL at 08:13

## 2022-08-05 RX ADMIN — SACUBITRIL AND VALSARTAN 1 TABLET: 49; 51 TABLET, FILM COATED ORAL at 20:17

## 2022-08-05 RX ADMIN — TRAMADOL HYDROCHLORIDE 50 MG: 50 TABLET, COATED ORAL at 23:31

## 2022-08-05 RX ADMIN — CHLORDIAZEPOXIDE HYDROCHLORIDE 25 MG: 25 CAPSULE ORAL at 15:33

## 2022-08-05 RX ADMIN — PANTOPRAZOLE SODIUM 40 MG: 40 TABLET, DELAYED RELEASE ORAL at 05:00

## 2022-08-05 RX ADMIN — MORPHINE SULFATE 2 MG: 2 INJECTION, SOLUTION INTRAMUSCULAR; INTRAVENOUS at 20:17

## 2022-08-05 RX ADMIN — SODIUM CHLORIDE, PRESERVATIVE FREE 2 ML: 5 INJECTION INTRAVENOUS at 08:19

## 2022-08-05 RX ADMIN — CHLORDIAZEPOXIDE HYDROCHLORIDE 25 MG: 25 CAPSULE ORAL at 08:13

## 2022-08-05 ASSESSMENT — LIFESTYLE VARIABLES
AGITATION: NORMAL ACTIVITY
AUDITORY DISTURBANCES: NOT PRESENT
HEADACHE, FULLNESS IN HEAD: NOT PRESENT
VISUAL DISTURBANCES: NOT PRESENT
TREMOR: NO TREMOR
AUDITORY DISTURBANCES: NOT PRESENT
VISUAL DISTURBANCES: NOT PRESENT
ANXIETY: NO ANXIETY, AT EASE
VISUAL DISTURBANCES: NOT PRESENT
PAROXYSMAL SWEATS: NO SWEAT VISIBLE
NAUSEA AND VOMITING: NO NAUSEA AND NO VOMITING
AGITATION: NORMAL ACTIVITY
TREMOR: NO TREMOR
TREMOR: NO TREMOR
ANXIETY: NO ANXIETY, AT EASE
ANXIETY: NO ANXIETY, AT EASE
PAROXYSMAL SWEATS: NO SWEAT VISIBLE
HEADACHE, FULLNESS IN HEAD: NOT PRESENT
ANXIETY: NO ANXIETY, AT EASE
TREMOR: NO TREMOR
VISUAL DISTURBANCES: NOT PRESENT
PAROXYSMAL SWEATS: NO SWEAT VISIBLE
VISUAL DISTURBANCES: NOT PRESENT
TREMOR: NO TREMOR
AUDITORY DISTURBANCES: NOT PRESENT
VISUAL DISTURBANCES: NOT PRESENT
NAUSEA AND VOMITING: NO NAUSEA AND NO VOMITING
AGITATION: SOMEWHAT MORE THAN NORMAL ACTIVITY
PAROXYSMAL SWEATS: NO SWEAT VISIBLE
PAROXYSMAL SWEATS: NO SWEAT VISIBLE
NAUSEA AND VOMITING: NO NAUSEA AND NO VOMITING
HEADACHE, FULLNESS IN HEAD: NOT PRESENT
AGITATION: NORMAL ACTIVITY
PAROXYSMAL SWEATS: BARELY PERCEPTIBLE SWEATING, PALMS MOIST
ANXIETY: NO ANXIETY, AT EASE
NAUSEA AND VOMITING: NO NAUSEA AND NO VOMITING
ANXIETY: NO ANXIETY, AT EASE
AGITATION: NORMAL ACTIVITY
HEADACHE, FULLNESS IN HEAD: NOT PRESENT
NAUSEA AND VOMITING: NO NAUSEA AND NO VOMITING
AUDITORY DISTURBANCES: NOT PRESENT
AUDITORY DISTURBANCES: NOT PRESENT
AGITATION: NORMAL ACTIVITY
NAUSEA AND VOMITING: NO NAUSEA AND NO VOMITING
TREMOR: NO TREMOR
ANXIETY: MILDLY ANXIOUS
VISUAL DISTURBANCES: NOT PRESENT
HEADACHE, FULLNESS IN HEAD: NOT PRESENT
TREMOR: NO TREMOR
NAUSEA AND VOMITING: NO NAUSEA AND NO VOMITING
AUDITORY DISTURBANCES: NOT PRESENT
HEADACHE, FULLNESS IN HEAD: NOT PRESENT
HEADACHE, FULLNESS IN HEAD: NOT PRESENT
PAROXYSMAL SWEATS: NO SWEAT VISIBLE
AGITATION: NORMAL ACTIVITY
AUDITORY DISTURBANCES: NOT PRESENT

## 2022-08-05 ASSESSMENT — PAIN SCALES - GENERAL
PAINLEVEL_OUTOF10: 10
PAINLEVEL_OUTOF10: 7
PAINLEVEL_OUTOF10: 10
PAINLEVEL_OUTOF10: 5
PAINLEVEL_OUTOF10: 0
PAINLEVEL_OUTOF10: 10
PAINLEVEL_OUTOF10: 0
PAINLEVEL_OUTOF10: 6
PAINLEVEL_OUTOF10: 10
PAINLEVEL_OUTOF10: 7

## 2022-08-06 ENCOUNTER — APPOINTMENT (OUTPATIENT)
Dept: GENERAL RADIOLOGY | Age: 37
DRG: 225 | End: 2022-08-06
Attending: INTERNAL MEDICINE

## 2022-08-06 LAB
ANION GAP SERPL CALC-SCNC: 6 MMOL/L (ref 7–19)
ATRIAL RATE (BPM): 84
ATRIAL RATE (BPM): 88
ATRIAL RATE (BPM): 90
BUN SERPL-MCNC: 17 MG/DL (ref 6–20)
BUN/CREAT SERPL: 19 (ref 7–25)
CALCIUM SERPL-MCNC: 9.4 MG/DL (ref 8.4–10.2)
CHLORIDE SERPL-SCNC: 107 MMOL/L (ref 97–110)
CO2 SERPL-SCNC: 28 MMOL/L (ref 21–32)
CREAT SERPL-MCNC: 0.91 MG/DL (ref 0.67–1.17)
FASTING DURATION TIME PATIENT: ABNORMAL H
GFR SERPLBLD BASED ON 1.73 SQ M-ARVRAT: >90 ML/MIN
GLUCOSE BLDC GLUCOMTR-MCNC: 136 MG/DL (ref 70–99)
GLUCOSE BLDC GLUCOMTR-MCNC: 150 MG/DL (ref 70–99)
GLUCOSE BLDC GLUCOMTR-MCNC: 168 MG/DL (ref 70–99)
GLUCOSE BLDC GLUCOMTR-MCNC: 92 MG/DL (ref 70–99)
GLUCOSE SERPL-MCNC: 130 MG/DL (ref 70–99)
MAGNESIUM SERPL-MCNC: 2.1 MG/DL (ref 1.7–2.4)
P AXIS (DEGREES): 50
P AXIS (DEGREES): 54
P AXIS (DEGREES): 59
POTASSIUM SERPL-SCNC: 4.1 MMOL/L (ref 3.4–5.1)
PR-INTERVAL (MSEC): 140
PR-INTERVAL (MSEC): 144
PR-INTERVAL (MSEC): 144
QRS-INTERVAL (MSEC): 102
QRS-INTERVAL (MSEC): 110
QRS-INTERVAL (MSEC): 112
QT-INTERVAL (MSEC): 370
QT-INTERVAL (MSEC): 380
QT-INTERVAL (MSEC): 382
QTC: 437
QTC: 460
QTC: 467
R AXIS (DEGREES): 11
R AXIS (DEGREES): 17
R AXIS (DEGREES): 43
RAINBOW EXTRA TUBES HOLD SPECIMEN: NORMAL
REPORT TEXT: NORMAL
SODIUM SERPL-SCNC: 137 MMOL/L (ref 135–145)
T AXIS (DEGREES): -57
T AXIS (DEGREES): -89
T AXIS (DEGREES): 93
VENTRICULAR RATE EKG/MIN (BPM): 84
VENTRICULAR RATE EKG/MIN (BPM): 88
VENTRICULAR RATE EKG/MIN (BPM): 90

## 2022-08-06 PROCEDURE — 10002803 HB RX 637: Performed by: INTERNAL MEDICINE

## 2022-08-06 PROCEDURE — 10002800 HB RX 250 W HCPCS: Performed by: INTERNAL MEDICINE

## 2022-08-06 PROCEDURE — 10002803 HB RX 637: Performed by: FAMILY MEDICINE

## 2022-08-06 PROCEDURE — 83735 ASSAY OF MAGNESIUM: CPT | Performed by: INTERNAL MEDICINE

## 2022-08-06 PROCEDURE — 71046 X-RAY EXAM CHEST 2 VIEWS: CPT

## 2022-08-06 PROCEDURE — 80048 BASIC METABOLIC PNL TOTAL CA: CPT | Performed by: INTERNAL MEDICINE

## 2022-08-06 PROCEDURE — 36415 COLL VENOUS BLD VENIPUNCTURE: CPT | Performed by: INTERNAL MEDICINE

## 2022-08-06 PROCEDURE — 10004651 HB RX, NO CHARGE ITEM: Performed by: INTERNAL MEDICINE

## 2022-08-06 PROCEDURE — 10002803 HB RX 637: Performed by: CLINICAL NURSE SPECIALIST

## 2022-08-06 PROCEDURE — 93005 ELECTROCARDIOGRAM TRACING: CPT | Performed by: INTERNAL MEDICINE

## 2022-08-06 PROCEDURE — 10006031 HB ROOM CHARGE TELEMETRY

## 2022-08-06 RX ORDER — HYDROCODONE BITARTRATE AND ACETAMINOPHEN 10; 325 MG/1; MG/1
1 TABLET ORAL EVERY 4 HOURS PRN
Status: DISCONTINUED | OUTPATIENT
Start: 2022-08-06 | End: 2022-08-08 | Stop reason: HOSPADM

## 2022-08-06 RX ORDER — HYDROCODONE BITARTRATE AND ACETAMINOPHEN 5; 325 MG/1; MG/1
1 TABLET ORAL EVERY 4 HOURS PRN
Status: DISCONTINUED | OUTPATIENT
Start: 2022-08-06 | End: 2022-08-08 | Stop reason: HOSPADM

## 2022-08-06 RX ADMIN — HYDROMORPHONE HYDROCHLORIDE 1 MG: 1 INJECTION, SOLUTION INTRAMUSCULAR; INTRAVENOUS; SUBCUTANEOUS at 05:51

## 2022-08-06 RX ADMIN — FUROSEMIDE 20 MG: 40 TABLET ORAL at 09:04

## 2022-08-06 RX ADMIN — NYSTATIN: 100000 POWDER TOPICAL at 09:06

## 2022-08-06 RX ADMIN — MELOXICAM 15 MG: 7.5 TABLET ORAL at 09:04

## 2022-08-06 RX ADMIN — DIBASIC SODIUM PHOSPHATE, MONOBASIC POTASSIUM PHOSPHATE AND MONOBASIC SODIUM PHOSPHATE 250 MG: 852; 155; 130 TABLET ORAL at 20:49

## 2022-08-06 RX ADMIN — CARVEDILOL 12.5 MG: 12.5 TABLET, FILM COATED ORAL at 21:52

## 2022-08-06 RX ADMIN — SODIUM CHLORIDE, PRESERVATIVE FREE 2 ML: 5 INJECTION INTRAVENOUS at 20:49

## 2022-08-06 RX ADMIN — ASPIRIN 81 MG: 81 TABLET, COATED ORAL at 09:04

## 2022-08-06 RX ADMIN — HYDROMORPHONE HYDROCHLORIDE 1 MG: 1 INJECTION, SOLUTION INTRAMUSCULAR; INTRAVENOUS; SUBCUTANEOUS at 09:35

## 2022-08-06 RX ADMIN — THIAMINE HCL TAB 100 MG 100 MG: 100 TAB at 09:04

## 2022-08-06 RX ADMIN — SACUBITRIL AND VALSARTAN 1 TABLET: 49; 51 TABLET, FILM COATED ORAL at 20:48

## 2022-08-06 RX ADMIN — HYDROMORPHONE HYDROCHLORIDE 1 MG: 1 INJECTION, SOLUTION INTRAMUSCULAR; INTRAVENOUS; SUBCUTANEOUS at 00:04

## 2022-08-06 RX ADMIN — SPIRONOLACTONE 25 MG: 25 TABLET ORAL at 09:04

## 2022-08-06 RX ADMIN — FOLIC ACID 1 MG: 1 TABLET ORAL at 09:04

## 2022-08-06 RX ADMIN — HYDROCODONE BITARTRATE AND ACETAMINOPHEN 1 TABLET: 10; 325 TABLET ORAL at 18:26

## 2022-08-06 RX ADMIN — SODIUM CHLORIDE, PRESERVATIVE FREE 2 ML: 5 INJECTION INTRAVENOUS at 09:00

## 2022-08-06 RX ADMIN — ATORVASTATIN CALCIUM 40 MG: 40 TABLET, FILM COATED ORAL at 20:48

## 2022-08-06 RX ADMIN — CHLORDIAZEPOXIDE HYDROCHLORIDE 25 MG: 25 CAPSULE ORAL at 09:04

## 2022-08-06 RX ADMIN — NYSTATIN: 100000 POWDER TOPICAL at 20:48

## 2022-08-06 RX ADMIN — INSULIN LISPRO 1 UNITS: 100 INJECTION, SOLUTION INTRAVENOUS; SUBCUTANEOUS at 09:04

## 2022-08-06 RX ADMIN — SACUBITRIL AND VALSARTAN 1 TABLET: 49; 51 TABLET, FILM COATED ORAL at 09:04

## 2022-08-06 RX ADMIN — DIBASIC SODIUM PHOSPHATE, MONOBASIC POTASSIUM PHOSPHATE AND MONOBASIC SODIUM PHOSPHATE 250 MG: 852; 155; 130 TABLET ORAL at 11:37

## 2022-08-06 RX ADMIN — Medication 9 MG: at 20:48

## 2022-08-06 RX ADMIN — CARVEDILOL 12.5 MG: 12.5 TABLET, FILM COATED ORAL at 09:04

## 2022-08-06 RX ADMIN — HYDROMORPHONE HYDROCHLORIDE 1 MG: 1 INJECTION, SOLUTION INTRAMUSCULAR; INTRAVENOUS; SUBCUTANEOUS at 14:11

## 2022-08-06 RX ADMIN — LIDOCAINE 3 PATCH: 4 PATCH TOPICAL at 09:14

## 2022-08-06 RX ADMIN — PANTOPRAZOLE SODIUM 40 MG: 40 TABLET, DELAYED RELEASE ORAL at 05:51

## 2022-08-06 ASSESSMENT — LIFESTYLE VARIABLES
AGITATION: NORMAL ACTIVITY
AUDITORY DISTURBANCES: NOT PRESENT
ANXIETY: NO ANXIETY, AT EASE
HEADACHE, FULLNESS IN HEAD: NOT PRESENT
HEADACHE, FULLNESS IN HEAD: NOT PRESENT
AGITATION: NORMAL ACTIVITY
VISUAL DISTURBANCES: NOT PRESENT
AUDITORY DISTURBANCES: NOT PRESENT
ANXIETY: MILDLY ANXIOUS
NAUSEA AND VOMITING: NO NAUSEA AND NO VOMITING
PAROXYSMAL SWEATS: NO SWEAT VISIBLE
NAUSEA AND VOMITING: NO NAUSEA AND NO VOMITING
AUDITORY DISTURBANCES: NOT PRESENT
TREMOR: NO TREMOR
NAUSEA AND VOMITING: NO NAUSEA AND NO VOMITING
NAUSEA AND VOMITING: NO NAUSEA AND NO VOMITING
AUDITORY DISTURBANCES: NOT PRESENT
VISUAL DISTURBANCES: NOT PRESENT
PAROXYSMAL SWEATS: NO SWEAT VISIBLE
HEADACHE, FULLNESS IN HEAD: NOT PRESENT
TREMOR: NO TREMOR
ANXIETY: NO ANXIETY, AT EASE
TREMOR: NO TREMOR
PAROXYSMAL SWEATS: NO SWEAT VISIBLE
VISUAL DISTURBANCES: NOT PRESENT
TREMOR: NO TREMOR
VISUAL DISTURBANCES: NOT PRESENT
ANXIETY: MILDLY ANXIOUS
HEADACHE, FULLNESS IN HEAD: NOT PRESENT
AGITATION: SOMEWHAT MORE THAN NORMAL ACTIVITY
HEADACHE, FULLNESS IN HEAD: NOT PRESENT
TREMOR: NO TREMOR
AUDITORY DISTURBANCES: NOT PRESENT
ANXIETY: NO ANXIETY, AT EASE
NAUSEA AND VOMITING: NO NAUSEA AND NO VOMITING
AGITATION: NORMAL ACTIVITY
AGITATION: NORMAL ACTIVITY
VISUAL DISTURBANCES: NOT PRESENT

## 2022-08-06 ASSESSMENT — PAIN DESCRIPTION - PAIN TYPE: TYPE: ACUTE PAIN

## 2022-08-06 ASSESSMENT — PAIN SCALES - GENERAL
PAINLEVEL_OUTOF10: 8
PAINLEVEL_OUTOF10: 8
PAINLEVEL_OUTOF10: 6
PAINLEVEL_OUTOF10: 8
PAINLEVEL_OUTOF10: 4
PAINLEVEL_OUTOF10: 7

## 2022-08-07 LAB
ANION GAP SERPL CALC-SCNC: 12 MMOL/L (ref 7–19)
BUN SERPL-MCNC: 18 MG/DL (ref 6–20)
BUN/CREAT SERPL: 23 (ref 7–25)
CALCIUM SERPL-MCNC: 9.3 MG/DL (ref 8.4–10.2)
CHLORIDE SERPL-SCNC: 105 MMOL/L (ref 97–110)
CO2 SERPL-SCNC: 23 MMOL/L (ref 21–32)
CREAT SERPL-MCNC: 0.78 MG/DL (ref 0.67–1.17)
DEPRECATED RDW RBC: 44.3 FL (ref 39–50)
ERYTHROCYTE [DISTWIDTH] IN BLOOD: 14.7 % (ref 11–15)
FASTING DURATION TIME PATIENT: ABNORMAL H
GFR SERPLBLD BASED ON 1.73 SQ M-ARVRAT: >90 ML/MIN
GLUCOSE BLDC GLUCOMTR-MCNC: 119 MG/DL (ref 70–99)
GLUCOSE BLDC GLUCOMTR-MCNC: 122 MG/DL (ref 70–99)
GLUCOSE BLDC GLUCOMTR-MCNC: 126 MG/DL (ref 70–99)
GLUCOSE BLDC GLUCOMTR-MCNC: 97 MG/DL (ref 70–99)
GLUCOSE SERPL-MCNC: 134 MG/DL (ref 70–99)
HCT VFR BLD CALC: 44.8 % (ref 39–51)
HGB BLD-MCNC: 14.3 G/DL (ref 13–17)
MAGNESIUM SERPL-MCNC: 2 MG/DL (ref 1.7–2.4)
MCH RBC QN AUTO: 26.6 PG (ref 26–34)
MCHC RBC AUTO-ENTMCNC: 31.9 G/DL (ref 32–36.5)
MCV RBC AUTO: 83.4 FL (ref 78–100)
NRBC BLD MANUAL-RTO: 0 /100 WBC
PLATELET # BLD AUTO: 281 K/MCL (ref 140–450)
POTASSIUM SERPL-SCNC: 3.9 MMOL/L (ref 3.4–5.1)
RBC # BLD: 5.37 MIL/MCL (ref 4.5–5.9)
SODIUM SERPL-SCNC: 136 MMOL/L (ref 135–145)
WBC # BLD: 9.2 K/MCL (ref 4.2–11)

## 2022-08-07 PROCEDURE — 83735 ASSAY OF MAGNESIUM: CPT | Performed by: FAMILY MEDICINE

## 2022-08-07 PROCEDURE — 10002803 HB RX 637: Performed by: INTERNAL MEDICINE

## 2022-08-07 PROCEDURE — 80048 BASIC METABOLIC PNL TOTAL CA: CPT | Performed by: FAMILY MEDICINE

## 2022-08-07 PROCEDURE — 13003289 HB OXYGEN THERAPY DAILY

## 2022-08-07 PROCEDURE — 10006031 HB ROOM CHARGE TELEMETRY

## 2022-08-07 PROCEDURE — 36415 COLL VENOUS BLD VENIPUNCTURE: CPT | Performed by: FAMILY MEDICINE

## 2022-08-07 PROCEDURE — 10002803 HB RX 637: Performed by: CLINICAL NURSE SPECIALIST

## 2022-08-07 PROCEDURE — 10002803 HB RX 637: Performed by: FAMILY MEDICINE

## 2022-08-07 PROCEDURE — 85027 COMPLETE CBC AUTOMATED: CPT | Performed by: FAMILY MEDICINE

## 2022-08-07 PROCEDURE — 10002800 HB RX 250 W HCPCS: Performed by: FAMILY MEDICINE

## 2022-08-07 PROCEDURE — 10004651 HB RX, NO CHARGE ITEM: Performed by: INTERNAL MEDICINE

## 2022-08-07 RX ADMIN — SACUBITRIL AND VALSARTAN 1 TABLET: 49; 51 TABLET, FILM COATED ORAL at 22:01

## 2022-08-07 RX ADMIN — Medication 9 MG: at 22:01

## 2022-08-07 RX ADMIN — THIAMINE HCL TAB 100 MG 100 MG: 100 TAB at 08:41

## 2022-08-07 RX ADMIN — LIDOCAINE 3 PATCH: 4 PATCH TOPICAL at 08:41

## 2022-08-07 RX ADMIN — FUROSEMIDE 20 MG: 40 TABLET ORAL at 08:42

## 2022-08-07 RX ADMIN — ASPIRIN 81 MG: 81 TABLET, COATED ORAL at 08:42

## 2022-08-07 RX ADMIN — NYSTATIN: 100000 POWDER TOPICAL at 22:02

## 2022-08-07 RX ADMIN — HYDROMORPHONE HYDROCHLORIDE 0.4 MG: 1 INJECTION, SOLUTION INTRAMUSCULAR; INTRAVENOUS; SUBCUTANEOUS at 09:28

## 2022-08-07 RX ADMIN — PANTOPRAZOLE SODIUM 40 MG: 40 TABLET, DELAYED RELEASE ORAL at 05:00

## 2022-08-07 RX ADMIN — HYDROCODONE BITARTRATE AND ACETAMINOPHEN 1 TABLET: 10; 325 TABLET ORAL at 00:46

## 2022-08-07 RX ADMIN — HYDROMORPHONE HYDROCHLORIDE 0.4 MG: 1 INJECTION, SOLUTION INTRAMUSCULAR; INTRAVENOUS; SUBCUTANEOUS at 05:54

## 2022-08-07 RX ADMIN — HYDROCODONE BITARTRATE AND ACETAMINOPHEN 1 TABLET: 10; 325 TABLET ORAL at 11:49

## 2022-08-07 RX ADMIN — DIBASIC SODIUM PHOSPHATE, MONOBASIC POTASSIUM PHOSPHATE AND MONOBASIC SODIUM PHOSPHATE 250 MG: 852; 155; 130 TABLET ORAL at 08:41

## 2022-08-07 RX ADMIN — HYDROMORPHONE HYDROCHLORIDE 0.4 MG: 1 INJECTION, SOLUTION INTRAMUSCULAR; INTRAVENOUS; SUBCUTANEOUS at 02:29

## 2022-08-07 RX ADMIN — HYDROMORPHONE HYDROCHLORIDE 0.4 MG: 1 INJECTION, SOLUTION INTRAMUSCULAR; INTRAVENOUS; SUBCUTANEOUS at 22:01

## 2022-08-07 RX ADMIN — SACUBITRIL AND VALSARTAN 1 TABLET: 49; 51 TABLET, FILM COATED ORAL at 08:42

## 2022-08-07 RX ADMIN — HYDROCODONE BITARTRATE AND ACETAMINOPHEN 1 TABLET: 10; 325 TABLET ORAL at 15:59

## 2022-08-07 RX ADMIN — SODIUM CHLORIDE, PRESERVATIVE FREE 2 ML: 5 INJECTION INTRAVENOUS at 08:47

## 2022-08-07 RX ADMIN — CARVEDILOL 12.5 MG: 12.5 TABLET, FILM COATED ORAL at 08:42

## 2022-08-07 RX ADMIN — MELOXICAM 15 MG: 7.5 TABLET ORAL at 08:42

## 2022-08-07 RX ADMIN — ATORVASTATIN CALCIUM 40 MG: 40 TABLET, FILM COATED ORAL at 22:01

## 2022-08-07 RX ADMIN — NYSTATIN: 100000 POWDER TOPICAL at 08:41

## 2022-08-07 RX ADMIN — CARVEDILOL 12.5 MG: 12.5 TABLET, FILM COATED ORAL at 22:01

## 2022-08-07 RX ADMIN — DIBASIC SODIUM PHOSPHATE, MONOBASIC POTASSIUM PHOSPHATE AND MONOBASIC SODIUM PHOSPHATE 250 MG: 852; 155; 130 TABLET ORAL at 22:01

## 2022-08-07 RX ADMIN — FOLIC ACID 1 MG: 1 TABLET ORAL at 08:42

## 2022-08-07 RX ADMIN — SPIRONOLACTONE 25 MG: 25 TABLET ORAL at 08:41

## 2022-08-07 RX ADMIN — SODIUM CHLORIDE, PRESERVATIVE FREE 2 ML: 5 INJECTION INTRAVENOUS at 22:02

## 2022-08-07 ASSESSMENT — PAIN SCALES - GENERAL
PAINLEVEL_OUTOF10: 8
PAINLEVEL_OUTOF10: 4
PAINLEVEL_OUTOF10: 10
PAINLEVEL_OUTOF10: 4
PAINLEVEL_OUTOF10: 9
PAINLEVEL_OUTOF10: 4
PAINLEVEL_OUTOF10: 5
PAINLEVEL_OUTOF10: 4
PAINLEVEL_OUTOF10: 10
PAINLEVEL_OUTOF10: 4
PAINLEVEL_OUTOF10: 4
PAINLEVEL_OUTOF10: 9
PAINLEVEL_OUTOF10: 7
PAINLEVEL_OUTOF10: 10
PAINLEVEL_OUTOF10: 4
PAINLEVEL_OUTOF10: 10

## 2022-08-08 VITALS
DIASTOLIC BLOOD PRESSURE: 78 MMHG | BODY MASS INDEX: 38.3 KG/M2 | WEIGHT: 273.59 LBS | OXYGEN SATURATION: 97 % | SYSTOLIC BLOOD PRESSURE: 119 MMHG | TEMPERATURE: 98.6 F | RESPIRATION RATE: 15 BRPM | HEIGHT: 71 IN | HEART RATE: 78 BPM

## 2022-08-08 LAB
ANION GAP SERPL CALC-SCNC: 11 MMOL/L (ref 7–19)
BASOPHILS # BLD: 0 K/MCL (ref 0–0.3)
BASOPHILS NFR BLD: 0 %
BUN SERPL-MCNC: 14 MG/DL (ref 6–20)
BUN/CREAT SERPL: 19 (ref 7–25)
CALCIUM SERPL-MCNC: 9.2 MG/DL (ref 8.4–10.2)
CHLORIDE SERPL-SCNC: 106 MMOL/L (ref 97–110)
CO2 SERPL-SCNC: 25 MMOL/L (ref 21–32)
CREAT SERPL-MCNC: 0.72 MG/DL (ref 0.67–1.17)
DEPRECATED RDW RBC: 42.1 FL (ref 39–50)
EOSINOPHIL # BLD: 0.1 K/MCL (ref 0–0.5)
EOSINOPHIL NFR BLD: 2 %
ERYTHROCYTE [DISTWIDTH] IN BLOOD: 14.2 % (ref 11–15)
FASTING DURATION TIME PATIENT: ABNORMAL H
GFR SERPLBLD BASED ON 1.73 SQ M-ARVRAT: >90 ML/MIN
GLUCOSE BLDC GLUCOMTR-MCNC: 113 MG/DL (ref 70–99)
GLUCOSE BLDC GLUCOMTR-MCNC: 126 MG/DL (ref 70–99)
GLUCOSE SERPL-MCNC: 125 MG/DL (ref 70–99)
HCT VFR BLD CALC: 42.8 % (ref 39–51)
HGB BLD-MCNC: 14.1 G/DL (ref 13–17)
IMM GRANULOCYTES # BLD AUTO: 0.1 K/MCL (ref 0–0.2)
IMM GRANULOCYTES # BLD: 1 %
LYMPHOCYTES # BLD: 2.6 K/MCL (ref 1–4.8)
LYMPHOCYTES NFR BLD: 28 %
MAGNESIUM SERPL-MCNC: 2.2 MG/DL (ref 1.7–2.4)
MCH RBC QN AUTO: 27.1 PG (ref 26–34)
MCHC RBC AUTO-ENTMCNC: 32.9 G/DL (ref 32–36.5)
MCV RBC AUTO: 82.1 FL (ref 78–100)
MONOCYTES # BLD: 1 K/MCL (ref 0.3–0.9)
MONOCYTES NFR BLD: 11 %
NEUTROPHILS # BLD: 5.3 K/MCL (ref 1.8–7.7)
NEUTROPHILS NFR BLD: 58 %
NRBC BLD MANUAL-RTO: 0 /100 WBC
PHOSPHATE SERPL-MCNC: 3.6 MG/DL (ref 2.4–4.7)
PLATELET # BLD AUTO: 264 K/MCL (ref 140–450)
POTASSIUM SERPL-SCNC: 3.8 MMOL/L (ref 3.4–5.1)
RBC # BLD: 5.21 MIL/MCL (ref 4.5–5.9)
SODIUM SERPL-SCNC: 138 MMOL/L (ref 135–145)
WBC # BLD: 9.1 K/MCL (ref 4.2–11)

## 2022-08-08 PROCEDURE — 10002803 HB RX 637: Performed by: INTERNAL MEDICINE

## 2022-08-08 PROCEDURE — 10004651 HB RX, NO CHARGE ITEM: Performed by: INTERNAL MEDICINE

## 2022-08-08 PROCEDURE — 83735 ASSAY OF MAGNESIUM: CPT | Performed by: INTERNAL MEDICINE

## 2022-08-08 PROCEDURE — 36415 COLL VENOUS BLD VENIPUNCTURE: CPT | Performed by: INTERNAL MEDICINE

## 2022-08-08 PROCEDURE — 10002803 HB RX 637: Performed by: FAMILY MEDICINE

## 2022-08-08 PROCEDURE — 80048 BASIC METABOLIC PNL TOTAL CA: CPT | Performed by: INTERNAL MEDICINE

## 2022-08-08 PROCEDURE — 10002803 HB RX 637: Performed by: CLINICAL NURSE SPECIALIST

## 2022-08-08 PROCEDURE — 10002803 HB RX 637: Performed by: NURSE PRACTITIONER

## 2022-08-08 PROCEDURE — 85025 COMPLETE CBC W/AUTO DIFF WBC: CPT | Performed by: INTERNAL MEDICINE

## 2022-08-08 PROCEDURE — 84100 ASSAY OF PHOSPHORUS: CPT | Performed by: INTERNAL MEDICINE

## 2022-08-08 RX ORDER — ATORVASTATIN CALCIUM 40 MG/1
40 TABLET, FILM COATED ORAL NIGHTLY
Qty: 30 TABLET | Refills: 0 | Status: SHIPPED | OUTPATIENT
Start: 2022-08-08 | End: 2022-09-07

## 2022-08-08 RX ORDER — PANTOPRAZOLE SODIUM 40 MG/1
40 TABLET, DELAYED RELEASE ORAL
Qty: 30 TABLET | Refills: 0 | Status: SHIPPED | OUTPATIENT
Start: 2022-08-09 | End: 2022-09-08

## 2022-08-08 RX ORDER — FUROSEMIDE 20 MG/1
20 TABLET ORAL DAILY
Qty: 30 TABLET | Refills: 0 | Status: SHIPPED | OUTPATIENT
Start: 2022-08-09 | End: 2022-09-08

## 2022-08-08 RX ORDER — CARVEDILOL 12.5 MG/1
12.5 TABLET ORAL EVERY 12 HOURS SCHEDULED
Qty: 60 TABLET | Refills: 0 | Status: SHIPPED | OUTPATIENT
Start: 2022-08-08 | End: 2022-09-07

## 2022-08-08 RX ORDER — LIDOCAINE 4 G/G
3 PATCH TOPICAL DAILY
Qty: 30 PATCH | Refills: 0 | Status: SHIPPED | OUTPATIENT
Start: 2022-08-09

## 2022-08-08 RX ORDER — ASPIRIN 81 MG/1
81 TABLET ORAL DAILY
Qty: 30 TABLET | Refills: 0 | Status: SHIPPED | OUTPATIENT
Start: 2022-08-09 | End: 2022-09-08

## 2022-08-08 RX ORDER — MELOXICAM 15 MG/1
15 TABLET ORAL DAILY
Qty: 7 TABLET | Refills: 0 | Status: SHIPPED | OUTPATIENT
Start: 2022-08-09

## 2022-08-08 RX ORDER — POTASSIUM CHLORIDE 20 MEQ/1
40 TABLET, EXTENDED RELEASE ORAL ONCE
Status: COMPLETED | OUTPATIENT
Start: 2022-08-08 | End: 2022-08-08

## 2022-08-08 RX ORDER — TRAMADOL HYDROCHLORIDE 50 MG/1
50 TABLET ORAL EVERY 6 HOURS PRN
Status: DISCONTINUED | OUTPATIENT
Start: 2022-08-08 | End: 2022-08-08 | Stop reason: HOSPADM

## 2022-08-08 RX ORDER — HYDROCODONE BITARTRATE AND ACETAMINOPHEN 10; 325 MG/1; MG/1
1 TABLET ORAL EVERY 4 HOURS PRN
Qty: 20 TABLET | Refills: 0 | Status: SHIPPED | OUTPATIENT
Start: 2022-08-08

## 2022-08-08 RX ADMIN — PANTOPRAZOLE SODIUM 40 MG: 40 TABLET, DELAYED RELEASE ORAL at 05:49

## 2022-08-08 RX ADMIN — ASPIRIN 81 MG: 81 TABLET, COATED ORAL at 09:32

## 2022-08-08 RX ADMIN — HYDROCODONE BITARTRATE AND ACETAMINOPHEN 1 TABLET: 10; 325 TABLET ORAL at 11:05

## 2022-08-08 RX ADMIN — HYDROCODONE BITARTRATE AND ACETAMINOPHEN 1 TABLET: 10; 325 TABLET ORAL at 06:39

## 2022-08-08 RX ADMIN — FUROSEMIDE 20 MG: 40 TABLET ORAL at 09:32

## 2022-08-08 RX ADMIN — SODIUM CHLORIDE, PRESERVATIVE FREE 2 ML: 5 INJECTION INTRAVENOUS at 09:36

## 2022-08-08 RX ADMIN — MELOXICAM 15 MG: 7.5 TABLET ORAL at 09:31

## 2022-08-08 RX ADMIN — DIBASIC SODIUM PHOSPHATE, MONOBASIC POTASSIUM PHOSPHATE AND MONOBASIC SODIUM PHOSPHATE 250 MG: 852; 155; 130 TABLET ORAL at 09:31

## 2022-08-08 RX ADMIN — THIAMINE HCL TAB 100 MG 100 MG: 100 TAB at 09:31

## 2022-08-08 RX ADMIN — LIDOCAINE 3 PATCH: 4 PATCH TOPICAL at 09:34

## 2022-08-08 RX ADMIN — FOLIC ACID 1 MG: 1 TABLET ORAL at 09:32

## 2022-08-08 RX ADMIN — EMPAGLIFLOZIN 10 MG: 10 TABLET, FILM COATED ORAL at 09:31

## 2022-08-08 RX ADMIN — NYSTATIN: 100000 POWDER TOPICAL at 09:36

## 2022-08-08 RX ADMIN — CARVEDILOL 12.5 MG: 12.5 TABLET, FILM COATED ORAL at 09:31

## 2022-08-08 RX ADMIN — POTASSIUM CHLORIDE 40 MEQ: 20 TABLET, EXTENDED RELEASE ORAL at 11:05

## 2022-08-08 RX ADMIN — HYDROCODONE BITARTRATE AND ACETAMINOPHEN 1 TABLET: 5; 325 TABLET ORAL at 02:46

## 2022-08-08 RX ADMIN — SPIRONOLACTONE 25 MG: 25 TABLET ORAL at 09:31

## 2022-08-08 RX ADMIN — SACUBITRIL AND VALSARTAN 1 TABLET: 49; 51 TABLET, FILM COATED ORAL at 09:32

## 2022-08-08 ASSESSMENT — PAIN SCALES - GENERAL
PAINLEVEL_OUTOF10: 7
PAINLEVEL_OUTOF10: 7
PAINLEVEL_OUTOF10: 5
PAINLEVEL_OUTOF10: 4
PAINLEVEL_OUTOF10: 8
PAINLEVEL_OUTOF10: 3

## 2022-08-25 ENCOUNTER — TELEPHONE (OUTPATIENT)
Dept: CARDIOLOGY | Age: 37
End: 2022-08-25

## 2022-08-25 ENCOUNTER — OFFICE VISIT (OUTPATIENT)
Dept: CARDIOLOGY | Age: 37
End: 2022-08-25

## 2022-08-25 ENCOUNTER — ANCILLARY PROCEDURE (OUTPATIENT)
Dept: CARDIOLOGY | Age: 37
End: 2022-08-25
Attending: INTERNAL MEDICINE

## 2022-08-25 VITALS
SYSTOLIC BLOOD PRESSURE: 96 MMHG | BODY MASS INDEX: 39.62 KG/M2 | HEIGHT: 71 IN | DIASTOLIC BLOOD PRESSURE: 52 MMHG | HEART RATE: 56 BPM | WEIGHT: 283 LBS

## 2022-08-25 DIAGNOSIS — E66.01 CLASS 2 SEVERE OBESITY DUE TO EXCESS CALORIES WITH SERIOUS COMORBIDITY AND BODY MASS INDEX (BMI) OF 39.0 TO 39.9 IN ADULT (CMD): ICD-10-CM

## 2022-08-25 DIAGNOSIS — I50.22 CHRONIC SYSTOLIC (CONGESTIVE) HEART FAILURE (CMD): ICD-10-CM

## 2022-08-25 DIAGNOSIS — G47.33 OSA (OBSTRUCTIVE SLEEP APNEA): ICD-10-CM

## 2022-08-25 DIAGNOSIS — Z95.810 ICD (IMPLANTABLE CARDIOVERTER-DEFIBRILLATOR), SINGLE, IN SITU: Primary | ICD-10-CM

## 2022-08-25 DIAGNOSIS — I47.20 VT (VENTRICULAR TACHYCARDIA) (CMD): ICD-10-CM

## 2022-08-25 PROBLEM — E66.09 OBESITY DUE TO EXCESS CALORIES: Status: ACTIVE | Noted: 2022-08-25

## 2022-08-25 PROCEDURE — 99214 OFFICE O/P EST MOD 30 MIN: CPT | Performed by: INTERNAL MEDICINE

## 2022-08-25 PROCEDURE — 93282 PRGRMG EVAL IMPLANTABLE DFB: CPT | Performed by: INTERNAL MEDICINE

## 2022-08-25 ASSESSMENT — PATIENT HEALTH QUESTIONNAIRE - PHQ9
CLINICAL INTERPRETATION OF PHQ2 SCORE: NO FURTHER SCREENING NEEDED
1. LITTLE INTEREST OR PLEASURE IN DOING THINGS: NOT AT ALL
2. FEELING DOWN, DEPRESSED OR HOPELESS: NOT AT ALL
SUM OF ALL RESPONSES TO PHQ9 QUESTIONS 1 AND 2: 0
SUM OF ALL RESPONSES TO PHQ9 QUESTIONS 1 AND 2: 0

## 2022-09-26 ENCOUNTER — ANCILLARY PROCEDURE (OUTPATIENT)
Dept: CARDIOLOGY | Age: 37
End: 2022-09-26
Attending: INTERNAL MEDICINE

## 2022-09-26 DIAGNOSIS — Z45.09 ENCOUNTER FOR ADJUSTMENT OR MANAGEMENT OF CARDIAC DEVICE: ICD-10-CM

## 2022-11-16 ENCOUNTER — ANCILLARY PROCEDURE (OUTPATIENT)
Dept: CARDIOLOGY | Age: 37
End: 2022-11-16
Attending: INTERNAL MEDICINE

## 2022-11-16 DIAGNOSIS — Z45.09 ENCOUNTER FOR ADJUSTMENT OR MANAGEMENT OF CARDIAC DEVICE: ICD-10-CM

## 2022-11-16 PROCEDURE — 93296 REM INTERROG EVL PM/IDS: CPT | Performed by: INTERNAL MEDICINE

## 2022-11-16 PROCEDURE — 93295 DEV INTERROG REMOTE 1/2/MLT: CPT | Performed by: INTERNAL MEDICINE

## 2022-12-08 ENCOUNTER — TELEPHONE (OUTPATIENT)
Dept: CARDIOLOGY | Age: 37
End: 2022-12-08

## (undated) DEVICE — CATHETER 4FR JL4 CRV 100CM LG INNER LUM RADOPQ SLCT INFNT

## (undated) DEVICE — KIT MICROINTRODUCER 5FR 21GA 7CM MAX COAX GW ECHGN NDL MINI

## (undated) DEVICE — Device

## (undated) DEVICE — SUTURE ETHIBOND EXCEL 0 5-34 30IN BRAID NABSB GRN X444H

## (undated) DEVICE — CABLE PCNG 12FT ALGTR CLIP STRL LF DISP

## (undated) DEVICE — SUTURE ETHIBOND EXCEL 0 CT1 30IN BRAID NABSB X424H

## (undated) DEVICE — STYLET LD MED 65CM RVOT KIT MOND SS

## (undated) DEVICE — CATHETER 4FR 3DRC CRV 100CM LG INNER LUM RADOPQ SLCT INFNT

## (undated) DEVICE — INTRODUCER SHTH 4FR 11CM MINI GW AVANTI+ MID LGTH EVAS STRL

## (undated) DEVICE — SUTURE VICRYL 4-0 SH-1 27IN BRAID COAT ABS UNDYED J218H

## (undated) DEVICE — SUTURE VCL+ 3-0 SH 27IN BRAID ANBCTRL COAT ABS UNDYED VCP416H

## (undated) DEVICE — SYRINGE 8ML ROTATE MALE LL ADPR RING GRIP MED INJECT8 CCS

## (undated) DEVICE — KIT SYRINGE 150ML FILL TUBE MEDRAD FASTURN DISP STRL LF MRK5

## (undated) DEVICE — GUIDEWIRE INQWR 150CM 3MM RDS J CRV .035IN VASC

## (undated) DEVICE — CATHETER 4FR 145D PGTL CRV 110CM LG INNER LUM 6 SH RADOPQ

## (undated) DEVICE — ELECTRODE DFBR A 6X4.25IN MLFNC ANDERSON CNCT RDTRNS PADPRO

## (undated) DEVICE — SUTURE VICRYL 2-0 CT1 27IN BRAID COAT ABS UNDYED J259H